# Patient Record
Sex: MALE | Race: WHITE | NOT HISPANIC OR LATINO | Employment: FULL TIME | URBAN - METROPOLITAN AREA
[De-identification: names, ages, dates, MRNs, and addresses within clinical notes are randomized per-mention and may not be internally consistent; named-entity substitution may affect disease eponyms.]

---

## 2017-05-08 ENCOUNTER — HOSPITAL ENCOUNTER (OUTPATIENT)
Dept: MRI IMAGING | Facility: CLINIC | Age: 37
Discharge: HOME | End: 2017-05-08
Attending: FAMILY MEDICINE | Admitting: FAMILY MEDICINE
Payer: COMMERCIAL

## 2017-05-08 DIAGNOSIS — M25.469 EFFUSION OF KNEE: ICD-10-CM

## 2017-05-08 PROCEDURE — 73721 MRI JNT OF LWR EXTRE W/O DYE: CPT | Mod: LT

## 2018-02-28 ENCOUNTER — TELEPHONE (OUTPATIENT)
Dept: OTOLARYNGOLOGY | Facility: CLINIC | Age: 38
End: 2018-02-28

## 2018-02-28 DIAGNOSIS — H81.10 BPV (BENIGN POSITIONAL VERTIGO): ICD-10-CM

## 2018-02-28 DIAGNOSIS — R42 VERTIGO: Primary | ICD-10-CM

## 2018-02-28 NOTE — TELEPHONE ENCOUNTER
Pt calling ENT triage to request an updated order for physical therapy.  Pt was referred to PT by Dr. Clarke in 2016 but was unable to schedule at the time due to constraints.  Pt would like to schedule at this time and needs an updated referral.  RN will assist in placing this referral.      Oliva BARTONN, RN  [phone]  [facility]   Head & Neck Surgery   2/28/2018 8:19 AM

## 2018-03-26 ENCOUNTER — HOSPITAL ENCOUNTER (OUTPATIENT)
Dept: PHYSICAL THERAPY | Facility: CLINIC | Age: 38
Setting detail: THERAPIES SERIES
End: 2018-03-26
Attending: OTOLARYNGOLOGY
Payer: COMMERCIAL

## 2018-03-26 PROCEDURE — 40000840 ZZHC STATISTIC PT VESTIBULAR VISIT: Performed by: PHYSICAL THERAPIST

## 2018-03-26 PROCEDURE — 97162 PT EVAL MOD COMPLEX 30 MIN: CPT | Mod: GP | Performed by: PHYSICAL THERAPIST

## 2018-03-28 NOTE — PROGRESS NOTES
"   18 1500   Quick Adds   Quick Adds Vestibular Eval   General Information   Start of Care Date 18   Referring Physician Dr Naina Clarke   Orders Evaluate and Treat as Indicated   Order Date 18   Medical Diagnosis Vertigo, BPV (benign positional vertigo)   Onset of illness/injury or Date of Surgery 18   Surgical/Medical history reviewed Yes   Pertinent history of current problem (include personal factors and/or comorbidities that impact the POC) Pt came prepared with a medical history including dx of BPPV about 15yrs ago by a friend who is a PT. Would get \"general vague dizziness\" - not spinning ever. Would come and go - daily for a few months, then totally gone for several months. Onset would be will URI. Had syncopal episode 4mo ago - hit head on a door when he went down. PCP thought he may have POTS - seeing cardiologist next week re: this posibility. Pt has not noticed any tachycardia with his sx. Nor has he had any hearing or vision changes other than occasionally \"needing to really concentrate to bring my world back into focus.\" Complexity: otherwise healthy.   Patient role/Employment history Employed  (RN inpatient psych)   Living environment House/[facility]   Home/Community Accessibility Comments stairs ok   Assistive Devices Comments position changes - light headed. Has 3mo old, 3yr old.   Patient/Family Goals Statement Figure out why these symptoms are happening.   General Information Comments Race bicycles - 5 days per day.   Fall Risk Screen   Fall screen completed by PT   Have you fallen 2 or more times in the past year? No   Have you fallen and had an injury in the past year? Yes   Is patient a fall risk? No   Functional Scales   Functional Scales and Outcomes DHI 36/100   Pain   Patient currently in pain No   Vital Signs   Pulse 64   BP (!) 142/96  (pt notes some whitecoat syndrome)   Other Vital Signs Standin/87, HR 67 (not reproduce sx). After balance testin/85, HR " 68. Supine: 120/77, HR 58. Sittin/71, HR 57. Standin/74, HR 66.   Cognitive Status Examination   Orientation orientation to person, place and time   Level of Consciousness alert   Follows Commands and Answers Questions 100% of the time;able to follow multistep instructions   Personal Safety and Judgment intact   Memory intact   Strength   Manual Muscle Testing Quick Adds No deficits were identified   Transfer Skills   Transfer Comments Pt able to quickly and easily stand without use of UEs.   Gait Special Tests 25 Foot Timed Walk   Seconds 5.53   Comments no AD, no gross gait deviations   Gait Special Tests Functional Gait Assessment Score out of 30   Score out of 30 29   Comments see note   Balance Special Tests Modified CTSIB Conditions   Condition 1, seconds 30 Seconds   Condition 2, seconds 30 Seconds   Condition 4, seconds 30 Seconds   Condition 5, seconds 30 Seconds   Modified CTSIB Comments no reproduction of sx, no abnormal sway.   Cervicogenic Screen   Neck ROM full ROM, no pain   Oculomotor Exam   Smooth Pursuit Normal   Saccades Normal   VOR Normal   Rapid Head Thrust Normal   Infrared Goggle Exam or Frenzel Lense Exam   Vestibular Suppressant in Last 24 Hours? No   Exam completed with Infrared Goggles   Spontaneous Nystagmus Negative   Gaze Evoked Nystagmus Negative   Head Shake Horizontal Nystagmus Negative   Liliana-Hallpike (right) Negative   Whitman-Hallpike (right) comments possible down beat nystagmus with return to midline - no sx, 3 beats. Was not repeatable.   Liliana-Hallpike (Left) Negative   [facility] Supine Roll Test (Right) Negative   [facility] Supine Roll Test (Left) Negative   Clinical Impression   Criteria for Skilled Therapeutic Interventions Met evaluation only   PT Diagnosis Unsteadiness of unclear etiology   Influenced by the following impairments Elevated subjective dizziness, reports of lightheadedness intermittently.   Functional limitations due to impairments lightheadedness with quick  positional changes.   Clinical Presentation Evolving/Changing   Clinical Presentation Rationale Increasing frequency of sx, unclear etiology   Clinical Decision Making (Complexity) Moderate complexity   Risk & Benefits of therapy have been explained Yes   Patient, Family & other staff in agreement with plan of care Yes   Clinical Impression Comments Evaluation only. No clear etiology of pt's sx based on eval today. No clear vestibular pathology, no clear tachycardia that would suggest a POTS situation. Did encourage pt to continue to see cardiology to see if they had any explanation. Message sent to cardiologist re: PT eval results.   Total Evaluation Time   Total Evaluation Time (Minutes) 45

## 2018-03-28 NOTE — PROGRESS NOTES
03/26/18 1500   Signing Clinician's Name / Credentials   Signing clinician's name / credentials Afua Hutton, DPT, NCS   Functional Gait Assessment (JADE Rousseau, MARAL Fry, et al. (2004))   1. GAIT LEVEL SURFACE 3  (4.4 secs)   2. CHANGE IN GAIT SPEED 3   3. GAIT WITH HORIZONTAL HEAD TURNS 2  (mild veer with head turn R)   4. GAIT WITH VERTICAL HEAD TURNS 3   5. GAIT AND PIVOT TURN 3   6. STEP OVER OBSTACLE 3   7. GAIT WITH NARROW BASE OF SUPPORT 3   8. GAIT WITH EYES CLOSED 3   9. AMBULATING BACKWARDS 3   10. STEPS 3   Total Functional Gait Assessment Score   TOTAL SCORE: (MAXIMUM SCORE 30) 29     Functional Gait Assessment (FGA): The FGA assesses postural stability during various walking tasks.   Gait assistive device used: none    Patient Score: 29/30  Scores of <22/30 have been correlated with predicting falls in community-dwelling older adults according to Soham & Davian 2010.   Scores of <18/30 have been correlated with increased risk for falls in patients with Parkinsons Disease according to Chase Flannery, Umana et al 2014.  Minimal Detectable Change for patients with acute/chronic stroke = 4.2 according to Henrik & Ritschel 2009  Minimal Detectable Change for patients with vestibular disorder = 8 according to Soham & Davian 2010    Assessment (rationale for performing, application to patient s function & care plan): assess falls risk, provoking activities.  Minutes billed as physical performance test: 0- day of eval

## 2018-04-05 ENCOUNTER — PRE VISIT (OUTPATIENT)
Dept: CARDIOLOGY | Facility: CLINIC | Age: 38
End: 2018-04-05

## 2018-04-05 ENCOUNTER — OFFICE VISIT (OUTPATIENT)
Dept: CARDIOLOGY | Facility: CLINIC | Age: 38
End: 2018-04-05
Attending: INTERNAL MEDICINE
Payer: COMMERCIAL

## 2018-04-05 VITALS
HEART RATE: 86 BPM | HEIGHT: 75 IN | BODY MASS INDEX: 22.38 KG/M2 | OXYGEN SATURATION: 100 % | WEIGHT: 180 LBS | SYSTOLIC BLOOD PRESSURE: 119 MMHG | DIASTOLIC BLOOD PRESSURE: 82 MMHG

## 2018-04-05 DIAGNOSIS — R42 DIZZINESS: ICD-10-CM

## 2018-04-05 DIAGNOSIS — R55 SYNCOPE, UNSPECIFIED SYNCOPE TYPE: Primary | ICD-10-CM

## 2018-04-05 PROCEDURE — 93010 ELECTROCARDIOGRAM REPORT: CPT | Mod: ZP | Performed by: INTERNAL MEDICINE

## 2018-04-05 PROCEDURE — 93005 ELECTROCARDIOGRAM TRACING: CPT | Mod: ZF

## 2018-04-05 PROCEDURE — 99203 OFFICE O/P NEW LOW 30 MIN: CPT | Mod: GC | Performed by: INTERNAL MEDICINE

## 2018-04-05 PROCEDURE — G0463 HOSPITAL OUTPT CLINIC VISIT: HCPCS | Mod: 25,ZF

## 2018-04-05 RX ORDER — SODIUM CHLORIDE 9 MG/ML
INJECTION, SOLUTION INTRAVENOUS CONTINUOUS
Status: CANCELLED | OUTPATIENT
Start: 2018-04-05

## 2018-04-05 RX ORDER — LIDOCAINE 40 MG/G
CREAM TOPICAL
Status: CANCELLED | OUTPATIENT
Start: 2018-04-05

## 2018-04-05 ASSESSMENT — PAIN SCALES - GENERAL: PAINLEVEL: NO PAIN (0)

## 2018-04-05 NOTE — NURSING NOTE
Chief Complaint   Patient presents with     Follow Up For     EKG- dizziness, ortho b/p. Ref by PCP Dr Shay Almaraz for ? POTS, saw ENT/vestibular work up 3/26/18, await care everywhere for auth to review any outisde records     Vitals were taken and medications were reconciled. EKG was performed.    Makenna Richter MA    12:48 PM

## 2018-04-05 NOTE — LETTER
4/5/2018      RE: Nathan Acharya  [address]  [place] [address]       Dear Colleague,    Thank you for the opportunity to participate in the care of your patient, Nathan Acharya, at the [facility]. Please see a copy of my visit note below.    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  His symptoms started about 15 years ago after a biking trip in [place]. He started having dizziness (he had difficulty describing what exactly he feels when gets dizzy) that he describes as difficulty focusing his vision, feeling off balance like he is going to fall and weak with turning his head. He denies having URI at the time. He saw ENT at that time and was diagnosed with BPPV. His symptoms have occurred intermittently every 3-6 months since then until 6 months ago when his symptoms became more pronounced.  In December, when he got up from his bed in morning, next thing he remembers is waking up on floor. He hit his head but no major injury. He denies having any symptoms prior to this LOC episode. He regained consciousness and his wife helped him to his feet. He denies feeling nausea or headache afterwards. No seizure like activity. Wife did not note any change in his complexion.  Over last 6 months he also has had dizziness with features included in description above as well feeling of reducing hearing and vision getting out of focus with orthostatic positional changes which is new. Prior to 6 months ago, he would get dizzy with mainly head movements. He also get dizzy with moving his eyes and while biking.  He also describes his depth perception as being off with not being able to clear a wall corner and running into wall with his side of his hip.    SH: occasional alcohol use, binge drinking while in college 10 years ago, no tobacco use or illicit drug use, he works as RN in  "psychiatry field    FH: no FH of SCD or relatives with similar symptoms    PAST MEDICAL HISTORY:  Past Medical History:   Diagnosis Date     Testicular cancer (H)        FAMILY HISTORY:  Family History   Problem Relation Age of Onset     Prostate Cancer Father        SOCIAL HISTORY:  Social History     Social History     Marital status: Single     Spouse name: N/A     Number of children: N/A     Years of education: N/A     Social History Main Topics     Smoking status: Former Smoker     Smokeless tobacco: Never Used     Alcohol use Yes     Drug use: No     Sexual activity: Yes     Partners: Female     Other Topics Concern     None     Social History Narrative       CURRENT MEDICATIONS:  Current Outpatient Prescriptions   Medication Sig Dispense Refill     loratadine (CLARITIN) 10 MG tablet Take 1 tablet (10 mg) by mouth daily (Patient not taking: Reported on 4/5/2018) 90 tablet 3     fluticasone (FLONASE) 50 MCG/ACT nasal spray Spray 1-2 sprays into both nostrils daily (Patient not taking: Reported on 4/5/2018) 16 g 11     NO ACTIVE MEDICATIONS          ROS:   Constitutional: No fever, chills, or sweats. No weight gain/loss.   ENT: No visual disturbance, ear ache, epistaxis, sore throat.   Allergies/Immunologic: Negative.   Respiratory: No cough, hemoptysis.   Cardiovascular: As per HPI.   GI: No nausea, vomiting, hematemesis, melena, or hematochezia.   : No urinary frequency, dysuria, or hematuria.   Integument: Negative.   Psychiatric: Negative.   Neuro: Negative.   Endocrinology: Negative.   Musculoskeletal: Negative.    EXAM:  /82 (BP Location: Left arm, Patient Position: Standing, Cuff Size: Adult Regular)  Pulse 86  Ht 1.905 m (6' 3\")  Wt 81.6 kg (180 lb)  SpO2 100%  BMI 22.5 kg/m2  General: appears comfortable, alert and articulate  Head: normocephalic, atraumatic  Eyes: anicteric sclera, EOMI  Neck: no adenopathy  Orophyarynx: moist mucosa, no lesions, dentition intact  Heart: regular, S1/S2, no " murmur, gallop, rub, estimated JVP 6cm  Lungs: clear, no rales or wheezing  Abdomen: soft, non-tender, bowel sounds present, no hepatosplenomegaly  Extremities: no clubbing, cyanosis or edema  Neurological: normal speech and affect, no gross motor deficits    Labs:  CBC RESULTS:  Lab Results   Component Value Date    WBC 4.8 09/11/2015    RBC 4.95 09/11/2015    HGB 15.8 09/11/2015    HCT 43.9 09/11/2015    MCV 89 09/11/2015    MCH 31.9 09/11/2015    MCHC 36.0 09/11/2015    RDW 11.9 09/11/2015     09/11/2015       CMP RESULTS:  Lab Results   Component Value Date     09/11/2015    POTASSIUM 4.4 09/11/2015    CHLORIDE 105 09/11/2015    CO2 31 09/11/2015    ANIONGAP 7.5 09/11/2015    GLC 75 09/11/2015    BUN 21 09/11/2015    CR 1.00 09/11/2015    GFRESTIMATED 84 09/11/2015    GFRESTBLACK >90 09/11/2015    VALENTE 9.4 09/11/2015    BILITOTAL 1.1 09/11/2015    ALBUMIN 4.5 09/11/2015    ALKPHOS 56 09/11/2015    ALT 30 09/11/2015    AST 22 09/11/2015        INR RESULTS:  No results found for: INR    Lab Results   Component Value Date    MAG 1.8 02/02/2014     No results found for: NTBNPI  No results found for: NTBNP    ECG today: NSR, right axis deviation, positive QRS in V1    Assessment and Plan:   37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    # Probably orthostatic hypotension  # Can't exclude vertigo or disequilibrium by history  - Tilt table study next week    # Right axis deviation, positive QRS in V1 on ECG  - Echo to exclude RVH. He had echo 10 years ago and he reports that his heart was turned more to right    -Follow up based on results of tilt table study    Staffed with Dr. Tia Davenport  General Cardiology Fellow, PGY5  Pager [phone]    I very much appreciated the opportunity to see and assess Nathan Acharya in the clinic with CV Fellow Dr Davenport I agree with the note above which summarizes my findings and current  recommendations. Please do not hesitate to contact my office if you have any questions or concerns.      Frederick Weber MD  Cardiac Arrhythmia Service  [facility]  [phone]    CC  Patient Care Team:  Frederick Case MD as PCP - General (Family Practice)  Teddy Neumann MD as Resident (Student in organized health care education/training program)  CHRISTIANE STEINBERG

## 2018-04-05 NOTE — PROGRESS NOTES
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  His symptoms started about 15 years ago after a biking trip in [place]. He started having dizziness (he had difficulty describing what exactly he feels when gets dizzy) that he describes as difficulty focusing his vision, feeling off balance like he is going to fall and weak with turning his head. He denies having URI at the time. He saw ENT at that time and was diagnosed with BPPV. His symptoms have occurred intermittently every 3-6 months since then until 6 months ago when his symptoms became more pronounced.  In December, when he got up from his bed in morning, next thing he remembers is waking up on floor. He hit his head but no major injury. He denies having any symptoms prior to this LOC episode. He regained consciousness and his wife helped him to his feet. He denies feeling nausea or headache afterwards. No seizure like activity. Wife did not note any change in his complexion.  Over last 6 months he also has had dizziness with features included in description above as well feeling of reducing hearing and vision getting out of focus with orthostatic positional changes which is new. Prior to 6 months ago, he would get dizzy with mainly head movements. He also get dizzy with moving his eyes and while biking.  He also describes his depth perception as being off with not being able to clear a wall corner and running into wall with his side of his hip.    SH: occasional alcohol use, binge drinking while in college 10 years ago, no tobacco use or illicit drug use, he works as RN in psychiatry field    FH: no FH of SCD or relatives with similar symptoms    PAST MEDICAL HISTORY:  Past Medical History:   Diagnosis Date     Testicular cancer (H)        FAMILY HISTORY:  Family History   Problem Relation Age of Onset     Prostate Cancer Father        SOCIAL HISTORY:  Social History     Social  "History     Marital status: Single     Spouse name: N/A     Number of children: N/A     Years of education: N/A     Social History Main Topics     Smoking status: Former Smoker     Smokeless tobacco: Never Used     Alcohol use Yes     Drug use: No     Sexual activity: Yes     Partners: Female     Other Topics Concern     None     Social History Narrative       CURRENT MEDICATIONS:  Current Outpatient Prescriptions   Medication Sig Dispense Refill     loratadine (CLARITIN) 10 MG tablet Take 1 tablet (10 mg) by mouth daily (Patient not taking: Reported on 4/5/2018) 90 tablet 3     fluticasone (FLONASE) 50 MCG/ACT nasal spray Spray 1-2 sprays into both nostrils daily (Patient not taking: Reported on 4/5/2018) 16 g 11     NO ACTIVE MEDICATIONS          ROS:   Constitutional: No fever, chills, or sweats. No weight gain/loss.   ENT: No visual disturbance, ear ache, epistaxis, sore throat.   Allergies/Immunologic: Negative.   Respiratory: No cough, hemoptysis.   Cardiovascular: As per HPI.   GI: No nausea, vomiting, hematemesis, melena, or hematochezia.   : No urinary frequency, dysuria, or hematuria.   Integument: Negative.   Psychiatric: Negative.   Neuro: Negative.   Endocrinology: Negative.   Musculoskeletal: Negative.    EXAM:  /82 (BP Location: Left arm, Patient Position: Standing, Cuff Size: Adult Regular)  Pulse 86  Ht 1.905 m (6' 3\")  Wt 81.6 kg (180 lb)  SpO2 100%  BMI 22.5 kg/m2  General: appears comfortable, alert and articulate  Head: normocephalic, atraumatic  Eyes: anicteric sclera, EOMI  Neck: no adenopathy  Orophyarynx: moist mucosa, no lesions, dentition intact  Heart: regular, S1/S2, no murmur, gallop, rub, estimated JVP 6cm  Lungs: clear, no rales or wheezing  Abdomen: soft, non-tender, bowel sounds present, no hepatosplenomegaly  Extremities: no clubbing, cyanosis or edema  Neurological: normal speech and affect, no gross motor deficits    Labs:  CBC RESULTS:  Lab Results   Component Value " Date    WBC 4.8 09/11/2015    RBC 4.95 09/11/2015    HGB 15.8 09/11/2015    HCT 43.9 09/11/2015    MCV 89 09/11/2015    MCH 31.9 09/11/2015    MCHC 36.0 09/11/2015    RDW 11.9 09/11/2015     09/11/2015       CMP RESULTS:  Lab Results   Component Value Date     09/11/2015    POTASSIUM 4.4 09/11/2015    CHLORIDE 105 09/11/2015    CO2 31 09/11/2015    ANIONGAP 7.5 09/11/2015    GLC 75 09/11/2015    BUN 21 09/11/2015    CR 1.00 09/11/2015    GFRESTIMATED 84 09/11/2015    GFRESTBLACK >90 09/11/2015    VALENTE 9.4 09/11/2015    BILITOTAL 1.1 09/11/2015    ALBUMIN 4.5 09/11/2015    ALKPHOS 56 09/11/2015    ALT 30 09/11/2015    AST 22 09/11/2015        INR RESULTS:  No results found for: INR    Lab Results   Component Value Date    MAG 1.8 02/02/2014     No results found for: NTBNPI  No results found for: NTBNP    ECG today: NSR, right axis deviation, positive QRS in V1    Assessment and Plan:   37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    # Probably orthostatic hypotension  # Can't exclude vertigo or disequilibrium by history  - Tilt table study next week    # Right axis deviation, positive QRS in V1 on ECG  - Echo to exclude RVH. He had echo 10 years ago and he reports that his heart was turned more to right    -Follow up based on results of tilt table study    Staffed with Dr. Tia Davenport  General Cardiology Fellow, PGY5  Pager [phone]    I very much appreciated the opportunity to see and assess Nathan Acharya in the clinic with CV Fellow Dr Davenport I agree with the note above which summarizes my findings and current recommendations. Please do not hesitate to contact my office if you have any questions or concerns.      Frederick Weber MD  Cardiac Arrhythmia Service  [facility]  [phone]    CC  Patient Care Team:  Frederick Case MD as PCP - General (Family Practice)  Teddy Neumann MD as Resident  (Student in organized health care education/training program)  CHRISTIANE STEINBERG

## 2018-04-05 NOTE — PATIENT INSTRUCTIONS
You will be scheduled for a follow up visit:       We encourage you to use My Chart as your primary form of communication if possible. If you need assistance in setting this up, please contact our office or ask at your follow up visit.     If you need a medication refill please contact your pharmacy. Please allow at least 3 business days for your refill to be completed.       Cardiology  Telephone Number    Aishwarya Funk [phone]   Or send a message to your provider via my chart.   For scheduling procedures:    [facility]    Clinic appointments       [phone]   For the Device Clinic (Pacemakers and ICD's)   RN's :   Maria Isabel Conroy  During business hours: [phone]    After business hours:   [phone]- select option 4 and ask for job code [id number].          As always, Thank you for trusting us with your health care needs!

## 2018-04-05 NOTE — TELEPHONE ENCOUNTER
HPI:     PAST MEDICAL HISTORY:  Past Medical History:   Diagnosis Date     Testicular cancer (H)        CURRENT MEDICATIONS:  Current Outpatient Prescriptions   Medication Sig Dispense Refill     loratadine (CLARITIN) 10 MG tablet Take 1 tablet (10 mg) by mouth daily 90 tablet 3     fluticasone (FLONASE) 50 MCG/ACT nasal spray Spray 1-2 sprays into both nostrils daily 16 g 11     NO ACTIVE MEDICATIONS          PAST SURGICAL HISTORY:  Past Surgical History:   Procedure Laterality Date     Testicular Cancer Surgery         ALLERGIES:   No Known Allergies    FAMILY HISTORY:  Family History   Problem Relation Age of Onset     Prostate Cancer Father        SOCIAL HISTORY:  Social History   Substance Use Topics     Smoking status: Former Smoker     Smokeless tobacco: Never Used     Alcohol use Yes       ROS:   Constitutional: No fever, chills, or sweats. Weight stable.   ENT: No visual disturbance, ear ache, epistaxis, sore throat.   Cardiovascular: As per HPI.   Respiratory: No cough, hemoptysis.    GI: No nausea, vomiting, hematemesis, melena, or hematochezia.   : No hematuria.   Integument: Negative.   Psychiatric: Negative.   Hematologic:  Easy bruising, no easy bleeding.  Neuro: Negative.   Endocrinology: No significant heat or cold intolerance   Musculoskeletal: No myalgia.    Exam:  There were no vitals taken for this visit.  GENERAL APPEARANCE: healthy, alert and no distress  HEENT: no icterus, no xanthelasmas, normal pupil size and reaction, normal palate, mucosa moist, no central cyanosis  NECK: no adenopathy, no asymmetry, masses, or scars, thyroid normal to palpation and no bruits, JVP not elevated  RESPIRATORY: lungs clear to auscultation - no rales, rhonchi or wheezes, no use of accessory muscles, no retractions, respirations are unlabored, normal respiratory rate  CARDIOVASCULAR: regular rhythm, normal S1 with physiologic split S2, no S3 or S4 and no murmur, click or rub, precordium quiet with normal  PMI.  ABDOMEN: soft, non tender, without hepatosplenomegaly, no masses palpable, bowel sounds normal, aorta not enlarged by palpation, no abdominal bruits  EXTREMITIES: peripheral pulses normal, no edema, no bruits  NEURO: alert and oriented to person/place/time, normal speech, gait and affect  VASC: Radial, femoral, dorsalis pedis and posterior tibialis pulses are normal in volumes and symmetric bilaterally. No bruits are heard.  SKIN: no ecchymoses, no rashes    Labs:  CBC RESULTS:   Lab Results   Component Value Date    WBC 4.8 09/11/2015    RBC 4.95 09/11/2015    HGB 15.8 09/11/2015    HCT 43.9 09/11/2015    MCV 89 09/11/2015    MCH 31.9 09/11/2015    MCHC 36.0 09/11/2015    RDW 11.9 09/11/2015     09/11/2015       BMP RESULTS:  Lab Results   Component Value Date     09/11/2015    POTASSIUM 4.4 09/11/2015    CHLORIDE 105 09/11/2015    CO2 31 09/11/2015    ANIONGAP 7.5 09/11/2015    GLC 75 09/11/2015    BUN 21 09/11/2015    CR 1.00 09/11/2015    GFRESTIMATED 84 09/11/2015    GFRESTBLACK >90 09/11/2015    VALENTE 9.4 09/11/2015        Procedures:              Assessment and Plan:               I very much appreciated the opportunity to see and assess this patient in the clinic with Cardiovascular Fellow        I agree with the above note which summarizes my findings and current recommendations.      Please do not hesitate to contact my office if you have any questions or concerns.       Frederick Weber MD  Cardiac Arrhythmia Service  [facility]  [phone]          CC

## 2018-04-05 NOTE — MR AVS SNAPSHOT
After Visit Summary   4/5/2018    Nathan Acharya    MRN: [id number]           Patient Information     Date Of Birth          1980        Visit Information        Provider Department      4/5/2018 12:30 PM Frederick Weber MD [facility]        Today's Diagnoses     Syncope, unspecified syncope type    -  1    Dizziness          Care Instructions    You will be scheduled for a follow up visit:       We encourage you to use My Chart as your primary form of communication if possible. If you need assistance in setting this up, please contact our office or ask at your follow up visit.     If you need a medication refill please contact your pharmacy. Please allow at least 3 business days for your refill to be completed.       Cardiology  Telephone Number    Aishwarya Funk [phone]   Or send a message to your provider via my chart.   For scheduling procedures:    [facility]    Clinic appointments       [phone]   For the Device Clinic (Pacemakers and ICD's)   RN's :   Maria Isabel Conroy  During business hours: [phone]    After business hours:   [phone]- select option 4 and ask for job code [id number].          As always, Thank you for trusting us with your health care needs!          Follow-ups after your visit        Your next 10 appointments already scheduled     Apr 09, 2018  8:30 AM CDT   Procedure 4 hour with U2A ROOM 1   Unit 2[address] [facility] ([facility])    [address] [address]               Apr 09, 2018 10:00 AM CDT   H Ep Study Tilt Table with [id number]   [facility]    [address] [address] [phone]              Future tests that were ordered for you today     Open Future Orders        Priority Expected Expires Ordered    EP study tilt table Routine  6/24/2018 4/5/2018     "Echocardiogram Routine 4/12/2018 7/11/2018 4/5/2018            Who to contact     If you have questions or need follow up information about today's clinic visit or your schedule please contact [facility] directly at [phone].  Normal or non-critical lab and imaging results will be communicated to you by Dole Tianhart, letter or phone within 4 business days after the clinic has received the results. If you do not hear from us within 7 days, please contact the clinic through Dole Tianhart or phone. If you have a critical or abnormal lab result, we will notify you by phone as soon as possible.  Submit refill requests through Jamii or call your pharmacy and they will forward the refill request to us. Please allow 3 business days for your refill to be completed.          Additional Information About Your Visit        Jamii Information     Jamii gives you secure access to your electronic health record. If you see a primary care provider, you can also send messages to your care team and make appointments. If you have questions, please call your primary care clinic.  If you do not have a primary care provider, please call [phone] and they will assist you.        Care EveryWhere ID     This is your Care EveryWhere ID. This could be used by other organizations to access your [facility] medical records  [id number]        Your Vitals Were     Pulse Height Pulse Oximetry BMI (Body Mass Index)          86 1.905 m (6' 3\") 100% 22.5 kg/m2         Blood Pressure from Last 3 Encounters:   04/05/18 119/82   06/15/16 126/78   09/20/15 100/60    Weight from Last 3 Encounters:   04/05/18 81.6 kg (180 lb)   06/15/16 85 kg (187 lb 8 oz)   09/20/15 83.9 kg (185 lb)              We Performed the Following     EKG 12-lead, tracing only (Same Day)        Primary Care Provider Office Phone # Fax #    Frederick Case [phone]       [address] [place] [address]        Equal Access to Services     " VICKI ANTON : Hadii aad reggie migue Odell, waaxda luqadaha, qaybta kaalmada anna, charles fela hayaziza mehtaramezefe ayers . So [facility] [phone].    ATENCIÓN: Si habla español, tiene a santiago disposición servicios gratuitos de asistencia lingüística. Llame al [phone].    We comply with applicable federal civil rights laws and [place] laws. We do not discriminate on the basis of race, color, national origin, age, disability, sex, sexual orientation, or gender identity.            Thank you!     Thank you for choosing [facility]  for your care. Our goal is always to provide you with excellent care. Hearing back from our patients is one way we can continue to improve our services. Please take a few minutes to complete the written survey that you may receive in the mail after your visit with us. Thank you!             Your Updated Medication List - Protect others around you: Learn how to safely use, store and throw away your medicines at www.disposemymeds.org.          This list is accurate as of 4/5/18  5:16 PM.  Always use your most recent med list.                   Brand Name Dispense Instructions for use Diagnosis    fluticasone 50 MCG/ACT spray    FLONASE    16 g    Spray 1-2 sprays into both nostrils daily    Seasonal allergic rhinitis       loratadine 10 MG tablet    CLARITIN    90 tablet    Take 1 tablet (10 mg) by mouth daily    Seasonal allergic rhinitis       NO ACTIVE MEDICATIONS

## 2018-04-06 LAB — INTERPRETATION ECG - MUSE: NORMAL

## 2018-04-09 ENCOUNTER — HOSPITAL ENCOUNTER (OUTPATIENT)
Facility: CLINIC | Age: 38
Discharge: HOME | End: 2018-04-09
Attending: INTERNAL MEDICINE | Admitting: INTERNAL MEDICINE
Payer: COMMERCIAL

## 2018-04-09 ENCOUNTER — APPOINTMENT (OUTPATIENT)
Dept: MEDSURG UNIT | Facility: CLINIC | Age: 38
End: 2018-04-09
Attending: INTERNAL MEDICINE
Payer: COMMERCIAL

## 2018-04-09 ENCOUNTER — APPOINTMENT (OUTPATIENT)
Dept: CARDIOLOGY | Facility: CLINIC | Age: 38
End: 2018-04-09
Attending: INTERNAL MEDICINE
Payer: COMMERCIAL

## 2018-04-09 VITALS
RESPIRATION RATE: 20 BRPM | TEMPERATURE: 98.1 F | HEIGHT: 75 IN | HEART RATE: 84 BPM | BODY MASS INDEX: 22.63 KG/M2 | WEIGHT: 182 LBS | DIASTOLIC BLOOD PRESSURE: 79 MMHG | OXYGEN SATURATION: 97 % | SYSTOLIC BLOOD PRESSURE: 128 MMHG

## 2018-04-09 DIAGNOSIS — R55 SYNCOPE, UNSPECIFIED SYNCOPE TYPE: ICD-10-CM

## 2018-04-09 LAB — GLUCOSE BLDC GLUCOMTR-MCNC: 83 MG/DL (ref 70–99)

## 2018-04-09 PROCEDURE — 40000166 ZZH STATISTIC PP CARE STAGE 1

## 2018-04-09 PROCEDURE — 93660 TILT TABLE EVALUATION: CPT

## 2018-04-09 PROCEDURE — 93660 TILT TABLE EVALUATION: CPT | Mod: 26 | Performed by: INTERNAL MEDICINE

## 2018-04-09 PROCEDURE — 95921 AUTONOMIC NRV PARASYM INERVJ: CPT

## 2018-04-09 PROCEDURE — 25000128 H RX IP 250 OP 636: Performed by: INTERNAL MEDICINE

## 2018-04-09 PROCEDURE — 95921 AUTONOMIC NRV PARASYM INERVJ: CPT | Mod: 26 | Performed by: INTERNAL MEDICINE

## 2018-04-09 PROCEDURE — 82962 GLUCOSE BLOOD TEST: CPT

## 2018-04-09 RX ORDER — LIDOCAINE 40 MG/G
CREAM TOPICAL
Status: DISCONTINUED | OUTPATIENT
Start: 2018-04-09 | End: 2018-04-09 | Stop reason: HOSPADM

## 2018-04-09 RX ORDER — SODIUM CHLORIDE 9 MG/ML
INJECTION, SOLUTION INTRAVENOUS CONTINUOUS
Status: DISCONTINUED | OUTPATIENT
Start: 2018-04-09 | End: 2018-04-09 | Stop reason: HOSPADM

## 2018-04-09 RX ADMIN — SODIUM CHLORIDE 1000 ML: 9 INJECTION, SOLUTION INTRAVENOUS at 09:28

## 2018-04-09 NOTE — PROGRESS NOTES
Returned from CCL s/p tilt table study.  Patient declined to have vitals taken.  Dr. Weber in room speaking with patient now.

## 2018-04-09 NOTE — PROGRESS NOTES
Pt returned post tilt table test, RN in cath lab reported pt spoke to Dr Weber and IV was discontinued in procedure, pt anxious to leave due to childcare issues; pt declined any vital signs, stated feels fine, steady walking on discharge out.  Pt dc to home per self, no sedation with procedure.

## 2018-04-09 NOTE — OP NOTE
EP PROCEDURE NOTE    *Procedures:*    1. TILT table test.  2. Autonomic function test.       *Cardiologist:*  Frederick Weber    *EP Fellow:*  N/A    *Referring MD: *  Dr Frederick Case    *Pre-operative Diagnosis:*  Syncope.    *Complications:*  None.     *Medications:*  NTG 0.4mg SL/None.     *Clinical Profile:*  Mr Acharya is a very fit 37 you male with history of presumed BPPV although the certainty of that diagnosis is uncleatr. More recently had yamilka Syncope after getting out of bed. Hit his head but no signif injury. Subsequent has dizziness assoc with reduced hearing and vision with position change. See clinic of 4/5/18 visit for details    The patient is here for autonomic testing including tilt testing.     Please see Syncope clinic visit note dated 4/5/18 for details.      PROCEDURE    The risks and benefits of the procedure were explained to the patient in   full. Written informed consent was obtained. The patient was brought to the   EP lab in a fasting and hemodynamically stable condition. Physical   examination of the patient did not reveal any carotid bruits, and review of   the chart did not reveal the presence of stroke or TIA within the past   three months.     The following maneuvers were done sequentially:    1- Sitting for 5 minutes:   End of 5 min:  HR 66 , /78    2- Standing for 10 minutes:   10 sec: HR 91 , BP 84/54   Immediate OH  30 sec HR 91  /67  1 min: HR 73 , /76  2 min: HR 75 , /78  3 min: HR 74 , /74  4 min: HR 69 , /79  5 min: HR 72 , /79  6 min: HR 72 , /77  7 min: HR 74 , /79  8 min: HR 75 , /78  9 min: HR 74 , /78  10 min: HR 78 , /84    3. Drinking  Baseline HR 84     /82    Low HR 94     /80     Latency time 36s    Vanna HR 86     /94   Latency time 44s    2- Maneuvers in seated position:    A. Carotid sinus massage:  Right:  Base HR 62 , /88.  Low  HR 40, /73     Left:  Base  HR 64 , /88  Low HR 59, /76    Symptoms: He had light headed in Right side.    B. Valsalva:   Baseline: HR 59 , /90  Phase 1: HR 70 , Max /119  Phase 2: HR 94 , Min /132  Phase 3: Present  Phase 4 (Overshoot): HR 70 , Max /90    Symptoms: None    C. Cough:   Not done    D. Deep Breathing   (1) in  53   Out  50  (2) in  54   Out  51  (3) in  54   Out  51  (4) in  54   Out  51    Delta HR = 3    3- Supine rest for 5 minutes:   HR 51 , Max /87    4- TILT at 70 degrees for 20 minutes: (1 liter fluid was given before tilt)  1 min: HR 60 , /83  2 min: HR 66 , BP 97/78  3 min: HR 64 , /76  4 min: HR 69 , /74  5 min: HR 67 , /79  6 min: HR 64 , BP 98/70  7 min: HR 70 , /73  8 min: HR 69 , /76  9 min: HR 69 , BP 94/69  10 min: HR 71 , BP 94/64  12 min: HR 66 , /73  14 min: HR 70 , BP 98/60  16 min: HR 80 , /74  18 min: HR 81 , /61  20 min: HR 71 , /79    Sinus Arrhythmia:  [id number]-67bpm :  Average delta== 14    5- NTG 0.4mg SL and monitoring for 5 minutes:   Not done      DISCUSSION:  Mr Acharya is a 36 yo male with dizziness that by history is postural in origin. He has had one LOC event getting out of bed. Findings today show some minor abnormalities. He did show Immediate OH >30 mmHg with lightheadedness consistent with his spontaneous symptoms. The Phase 4 overshoot with valsalva was missing but Phase 2 showed normal increment iof HR and good BP support.  Similarly, HRV was less than expected for age (ie 3 versus >12) but later in study he showed normal Sinus Arrhythmia. Consequently, the findings are not worrisome at this time.    On Station 2A I reviewed findings and pertinent physiology. and discussed importance of increased dietary salt and use of electectrolye drinks (approx 50 oz daily of Pedialye mixed 1:1 with water). He voiced understanding. We will follow-up in clinic in approx 3  months    Diagnosis: Immediate OH    I appreciated the opportunity to see and assess Mr Marck and direct the procedure described above. The above note summarizes my findings and current recommendations. Please do not hesitate to contact me if you have any questions or concerns.    Frederick Weber MD [facility]   Pager [phone]  Office [phone]

## 2018-04-09 NOTE — PROGRESS NOTES
Patient arrived to 2A for Tilt table procedure. IV inserted. Assessment complete. Vitals WNL. Consent needs to be signed.

## 2019-11-07 ENCOUNTER — HEALTH MAINTENANCE LETTER (OUTPATIENT)
Age: 39
End: 2019-11-07

## 2019-11-16 ENCOUNTER — IMMUNIZATION (OUTPATIENT)
Dept: NURSING | Facility: CLINIC | Age: 39
End: 2019-11-16
Payer: COMMERCIAL

## 2019-11-16 DIAGNOSIS — Z23 NEED FOR PROPHYLACTIC VACCINATION AND INOCULATION AGAINST INFLUENZA: Primary | ICD-10-CM

## 2019-11-16 PROCEDURE — 90686 IIV4 VACC NO PRSV 0.5 ML IM: CPT

## 2019-11-16 PROCEDURE — 90471 IMMUNIZATION ADMIN: CPT

## 2019-11-16 PROCEDURE — 99207 ZZC NO CHARGE NURSE ONLY: CPT

## 2020-02-14 ENCOUNTER — TRANSFERRED RECORDS (OUTPATIENT)
Dept: HEALTH INFORMATION MANAGEMENT | Facility: CLINIC | Age: 40
End: 2020-02-14

## 2020-02-14 ENCOUNTER — MEDICAL CORRESPONDENCE (OUTPATIENT)
Dept: HEALTH INFORMATION MANAGEMENT | Facility: CLINIC | Age: 40
End: 2020-02-14

## 2020-02-17 ENCOUNTER — TRANSCRIBE ORDERS (OUTPATIENT)
Dept: OTHER | Age: 40
End: 2020-02-17

## 2020-02-17 DIAGNOSIS — R68.81 EARLY SATIETY: ICD-10-CM

## 2020-02-17 DIAGNOSIS — R19.5 ABNORMAL STOOLS: ICD-10-CM

## 2020-02-17 DIAGNOSIS — R10.9 ABDOMINAL PAIN, UNSPECIFIED ABDOMINAL LOCATION: Primary | ICD-10-CM

## 2020-02-17 DIAGNOSIS — R11.0 NAUSEA: ICD-10-CM

## 2020-02-19 ENCOUNTER — DOCUMENTATION ONLY (OUTPATIENT)
Dept: GASTROENTEROLOGY | Facility: CLINIC | Age: 40
End: 2020-02-19

## 2020-02-19 NOTE — PROGRESS NOTES
Action Rosa 2/19 1:01PM    Action Taken Called and spoke with pt, recs are coming from [facility]. Per pt no outside recs or previous GI work up.   Called [facility]- Per Clinic staff she will forward a message to Medical Records dept and have OV notes faxed to [phone].      Action Rosa 2/20/20 8:27AM   Action Taken Received recs from [facility] - sent to urgent scanning      Referring Provider and Clinic:  [facility]- [facility] (Recs scanned in epic)      Diagnosis:  Abdominal pain, unspecified abdominal location, Abnormal stools, Nausea, Early satiety     GI notes or primary provider notes related to GI problem:   Y     Pathology Reports: N    Recent Lab Reports: N    Radiology Reports (CT/MRI) : N    Endoscopy:  N    Colonoscopy: N    Notes: Recs fwd to [facility]dy [facility] to review 2/20/20 8:40AM       Referral Date:     Date complete records received and sent for review:     Date records scanned into epic:     Provider Review Date:     Date review routed back to sender:     Letter sent:     Notes:

## 2020-03-03 NOTE — PROGRESS NOTES
REFERRAL REVIEW FORM    Is this a second opinion from another GI physician?  (If yes, OK to refer to for an MD only clinic visit)    Reason for referral: Abdominal pain    Date records reviewed: March 3, 2020     Previous work up:    Labs    Summary of pertinent GI work-up:  None    Recommendation:    Schedule clinic appointment with general GI provider (MD, JOSE JUAN or fellow) - Choose this if patient has not been seen by a GI provider for this problem    When to schedule:  Within 1 month    Comments:

## 2020-03-11 NOTE — PROGRESS NOTES
Pt is scheduled but not until June. CC called and lvm for pt to call direct line to get scheduled sooner if he would like.

## 2020-03-11 NOTE — TELEPHONE ENCOUNTER
RECORDS RECEIVED FROM: [facility]- Dr. Shay Almaraz   DATE RECEIVED: 6/23/2020   NOTES STATUS DETAILS   OFFICE NOTE from referring provider Received 2/14/2020 Office visit with Dr. Almaraz   OFFICE NOTE from other specialist N/A    DISCHARGE SUMMARY from hospital N/A    OPERATIVE REPORT N/A    MEDICATION LIST N/A         ENDOSCOPY  N/A    COLONOSCOPY N/A    ERCP N/A    EUS N/A    STOOL TESTING N/A    PERTINENT LABS N/A    PATHOLOGY REPORTS (RELATED) N/A    IMAGING (CT, MRI, EGD) N/A      REFERRAL INFORMATION    Date referral was placed: 6/23/2020   Date all records received: N/A   Date records were scanned into Epic: N/A   Date records were sent to Provider to review: N/A   Date and recommendation received from provider:  LETTER SENT  SCHEDULE APPOINTMENT   Date patient was contacted to schedule: 3/10/2020     Action 3/11/2020 2:49pm -Latha   Action Taken Called and LVM for Pt; called to inquire about outside med recs.      3/18/2020 9:46am Received a voice message from Pt regarding additional outside med recs. Per Pt, Pt has not been seen anywhere for those this DX. -Bhao

## 2020-06-18 ENCOUNTER — TELEPHONE (OUTPATIENT)
Dept: GASTROENTEROLOGY | Facility: CLINIC | Age: 40
End: 2020-06-18

## 2020-06-19 ENCOUNTER — RESULTS ONLY (OUTPATIENT)
Dept: LAB | Age: 40
End: 2020-06-19

## 2020-06-19 ENCOUNTER — APPOINTMENT (OUTPATIENT)
Dept: LAB | Facility: CLINIC | Age: 40
End: 2020-06-19
Attending: INTERNAL MEDICINE
Payer: COMMERCIAL

## 2020-06-19 ENCOUNTER — TELEPHONE (OUTPATIENT)
Dept: GASTROENTEROLOGY | Facility: CLINIC | Age: 40
End: 2020-06-19

## 2020-06-20 LAB
COVID-19 SPIKE RBD ABY TITER: NORMAL
COVID-19 SPIKE RBD ABY: NEGATIVE

## 2020-06-23 ENCOUNTER — PRE VISIT (OUTPATIENT)
Dept: GASTROENTEROLOGY | Facility: CLINIC | Age: 40
End: 2020-06-23

## 2020-07-31 ENCOUNTER — OFFICE VISIT - HEALTHEAST (OUTPATIENT)
Dept: OTOLARYNGOLOGY | Facility: CLINIC | Age: 40
End: 2020-07-31

## 2020-07-31 DIAGNOSIS — K21.9 LPRD (LARYNGOPHARYNGEAL REFLUX DISEASE): ICD-10-CM

## 2020-07-31 DIAGNOSIS — R13.10 DYSPHAGIA, UNSPECIFIED TYPE: ICD-10-CM

## 2020-08-04 ENCOUNTER — APPOINTMENT (OUTPATIENT)
Dept: GENERAL RADIOLOGY | Facility: CLINIC | Age: 40
End: 2020-08-04
Attending: EMERGENCY MEDICINE
Payer: OTHER MISCELLANEOUS

## 2020-08-04 ENCOUNTER — HOSPITAL ENCOUNTER (EMERGENCY)
Facility: CLINIC | Age: 40
Discharge: HOME | End: 2020-08-04
Attending: EMERGENCY MEDICINE | Admitting: EMERGENCY MEDICINE
Payer: OTHER MISCELLANEOUS

## 2020-08-04 VITALS
SYSTOLIC BLOOD PRESSURE: 136 MMHG | WEIGHT: 190 LBS | HEIGHT: 75 IN | DIASTOLIC BLOOD PRESSURE: 80 MMHG | OXYGEN SATURATION: 98 % | RESPIRATION RATE: 18 BRPM | TEMPERATURE: 97.5 F | BODY MASS INDEX: 23.62 KG/M2 | HEART RATE: 69 BPM

## 2020-08-04 DIAGNOSIS — S60.412A ABRASION OF RIGHT MIDDLE FINGER, INITIAL ENCOUNTER: ICD-10-CM

## 2020-08-04 DIAGNOSIS — S60.410A ABRASION OF RIGHT INDEX FINGER, INITIAL ENCOUNTER: ICD-10-CM

## 2020-08-04 PROCEDURE — 73140 X-RAY EXAM OF FINGER(S): CPT | Mod: RT

## 2020-08-04 PROCEDURE — 99283 EMERGENCY DEPT VISIT LOW MDM: CPT | Performed by: EMERGENCY MEDICINE

## 2020-08-04 PROCEDURE — 99283 EMERGENCY DEPT VISIT LOW MDM: CPT | Mod: Z6 | Performed by: EMERGENCY MEDICINE

## 2020-08-04 ASSESSMENT — ENCOUNTER SYMPTOMS
FEVER: 0
DIFFICULTY URINATING: 0
CONFUSION: 0
NUMBNESS: 0
WOUND: 1
SHORTNESS OF BREATH: 0
HEADACHES: 0
ABDOMINAL PAIN: 0
NECK STIFFNESS: 0
ARTHRALGIAS: 0
COLOR CHANGE: 0
EYE REDNESS: 0

## 2020-08-04 ASSESSMENT — MIFFLIN-ST. JEOR: SCORE: 1852.46

## 2020-08-04 NOTE — ED PROVIDER NOTES
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  He was on ground with hands on floor & somone opened door over the top of index and middle fingers.  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     Nathan Acharya is a right-handed 39 year old male who presents to the Emergency Department for evaluation of a right hand injury. 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 While on the ground with his hands on the floor, when someone opened the door with the top of his right second and third finger.  The patient endorses hyperextension of these fingers.  The patient states that he did clean it with soap and water.  He states this happened 3 hours ago.  The patient has noticed increased swelling since then.  Patient denies numbness.  Patient's last Tdap was 12/09/2014.    I have reviewed the Medications, Allergies, Past Medical and Surgical History, and Social History in the [employer] system.  PAST MEDICAL HISTORY:   Past Medical History:   Diagnosis Date     Testicular cancer (H)        PAST SURGICAL HISTORY:   Past Surgical History:   Procedure Laterality Date     Testicular Cancer Surgery         Past medical history, past surgical history, medications, and allergies were reviewed with the patient. Additional pertinent items: None    FAMILY HISTORY:   Family History   Problem Relation Age of Onset     Prostate Cancer Father        SOCIAL HISTORY:   Social History     Tobacco Use     Smoking status: Former Smoker     Smokeless tobacco: Never Used   Substance Use Topics     Alcohol use: Yes     Comment: occ     Social history was reviewed with the patient. Additional pertinent items: None      Patient's Medications   New Prescriptions    No medications on file   Previous  "Medications    FLUTICASONE (FLONASE) 50 MCG/ACT NASAL SPRAY    Spray 1-2 sprays into both nostrils daily    LORATADINE (CLARITIN) 10 MG TABLET    Take 1 tablet (10 mg) by mouth daily    NO ACTIVE MEDICATIONS       Modified Medications    No medications on file   Discontinued Medications    No medications on file        No Known Allergies     Review of Systems   Constitutional: Negative for fever.   HENT: Negative for congestion.    Eyes: Negative for redness.   Respiratory: Negative for shortness of breath.    Cardiovascular: Negative for chest pain.   Gastrointestinal: Negative for abdominal pain.   Genitourinary: Negative for difficulty urinating.   Musculoskeletal: Negative for arthralgias and neck stiffness.        Positive for right 2nd and 3rd finger swelling     Skin: Positive for wound (right 2nd and 3rd finger). Negative for color change.   Neurological: Negative for numbness and headaches.   Psychiatric/Behavioral: Negative for confusion.   All other systems reviewed and are negative.    Physical Exam   BP: 136/80  Pulse: 69  Temp: 97.5  F (36.4  C)  Resp: 18  Height: 190.5 cm (6' 3\")  Weight: 86.2 kg (190 lb)  SpO2: 98 %      Physical Exam  Constitutional:       General: He is not in acute distress.     Appearance: He is not diaphoretic.   HENT:      Head: Atraumatic.   Eyes:      General: No scleral icterus.     Pupils: Pupils are equal, round, and reactive to light.   Cardiovascular:      Heart sounds: Normal heart sounds.   Pulmonary:      Effort: No respiratory distress.      Breath sounds: Normal breath sounds.   Abdominal:      General: Bowel sounds are normal.      Palpations: Abdomen is soft.      Tenderness: There is no abdominal tenderness.   Musculoskeletal:         General: No tenderness.   Skin:     General: Skin is warm.      Findings: No rash.         ED Course   1:55 PM  The patient was seen and examined by Soren Mcbride MD in Room ED08.      Procedures                           Results " for orders placed or performed during the hospital encounter of 08/04/20 (from the past 24 hour(s))   XR Finger Right G/E 2 Views    Narrative    RIGHT FINGER TWO OR MORE VIEWS  8/4/2020 2:15 PM     HISTORY: Injury to index and middle finger; rule out fracture.    COMPARISON: None.      Impression    IMPRESSION: No bony abnormality. Mild soft tissue swelling about the  third finger.     Medications - No data to display          Assessments & Plan (with Medical Decision Making)   39-year-old male presents for evaluation of trauma to right index and middle fingers.  Differential included abrasion, contusion, sprain, fracture, subungual hematoma.  Exam reveals evidence of abrasions to the dorsal PIP joints of the after mentioned fingers.  X-ray was obtained which was negative.  Wounds were cleaned, antibiotic ointment applied and sterile dressing given.  Patient will be discharged with wound sheet and instructions to follow-up either here or with primary care provider for further evaluation and management.    I have reviewed the nursing notes.    I have reviewed the findings, diagnosis, plan and need for follow up with the patient.    New Prescriptions    No medications on file       Final diagnoses:   Abrasion of right index finger, initial encounter   Abrasion of right middle finger, initial encounter       8/4/2020   [facility], EMERGENCY DEPARTMENT    Jose Guadalupe MEJIA, am serving as a trained medical scribe to document services personally performed by Soren Mcbride MD, based on the provider's statements to me.     Soren MEJIA MD, was physically present and have reviewed and verified the accuracy of this note documented by Jose Guadalupe Segovia.       Soren Mcbride MD  08/04/20 [id number]

## 2020-08-04 NOTE — ED AVS SNAPSHOT
[facility], [place], Emergency Department  [address]  [place] [place] [place] [address]  Phone:  [phone]  Fax:  [phone]                                    Nathan Acharya   MRN: [id number]    Department:  [facility], Emergency Department   Date of Visit:  8/4/2020           After Visit Summary Signature Page    I have received my discharge instructions, and my questions have been answered. I have discussed any challenges I see with this plan with the nurse or doctor.    ..........................................................................................................................................  Patient/Patient Representative Signature      ..........................................................................................................................................  Patient Representative Print Name and Relationship to Patient    ..................................................               ................................................  Date                                   Time    ..........................................................................................................................................  Reviewed by Signature/Title    ...................................................              ..............................................  Date                                               Time          22EPIC Rev 08/18

## 2020-09-28 ENCOUNTER — VIRTUAL VISIT (OUTPATIENT)
Dept: FAMILY MEDICINE | Facility: CLINIC | Age: 40
End: 2020-09-28
Payer: COMMERCIAL

## 2020-09-28 VITALS — SYSTOLIC BLOOD PRESSURE: 130 MMHG | BODY MASS INDEX: 23.75 KG/M2 | DIASTOLIC BLOOD PRESSURE: 85 MMHG | WEIGHT: 190 LBS
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
## 2020-09-28 PROCEDURE — 99213 OFFICE O/P EST LOW 20 MIN: CPT | Mod: TEL | Performed by: FAMILY MEDICINE
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
## 2020-11-29 ENCOUNTER — HEALTH MAINTENANCE LETTER (OUTPATIENT)
Age: 40
End: 2020-11-29

## 2020-12-02 NOTE — TELEPHONE ENCOUNTER
MEDICAL RECORDS REQUEST   [facility]  Urology Clinic  [address] [place] [address]  PHONE: [phone]  Fax: [phone]        FUTURE VISIT INFORMATION                                                   LASHELL Ambriz: 1980 scheduled for future visit at [facility]    APPOINTMENT INFORMATION:    Date: 12/15/20 10:30AM    Provider:  Shonda Ritter PA-C    Reason for Visit/Diagnosis: Testicular pain     REFERRAL INFORMATION:    Referring provider:  Self    Specialty: N/A    Referring providers clinic:  N/A    Clinic contact number:  N/A    RECORDS REQUESTED FOR VISIT                                                     NOTES  STATUS/DETAILS   OFFICE NOTE from referring provider  no   OFFICE NOTE from other specialist  in process   DISCHARGE SUMMARY from hospital  no   DISCHARGE REPORT from the ER  no   OPERATIVE REPORT  no   MEDICATION LIST  in process     PRE-VISIT CHECKLIST      Record collection complete If no, please explain: CSS called pt to check for additional outside recs-    Appointment appropriately scheduled           (right time/right provider) Yes   MyChart activation Yes   Questionnaire complete In Process      Completed by: Rosa Flannery

## 2020-12-07 ENCOUNTER — PRE VISIT (OUTPATIENT)
Dept: UROLOGY | Facility: CLINIC | Age: 40
End: 2020-12-07

## 2020-12-07 NOTE — TELEPHONE ENCOUNTER
Visit Type : Testicular pain     Records/Orders: self referring some record in process     Pt Contacted: no    At Rooming: video

## 2020-12-15 ENCOUNTER — VIRTUAL VISIT (OUTPATIENT)
Dept: UROLOGY | Facility: CLINIC | Age: 40
End: 2020-12-15
Payer: COMMERCIAL

## 2020-12-15 ENCOUNTER — PRE VISIT (OUTPATIENT)
Dept: UROLOGY | Facility: CLINIC | Age: 40
End: 2020-12-15

## 2020-12-15 DIAGNOSIS — Z85.47 H/O TESTICULAR CANCER: ICD-10-CM

## 2020-12-15 DIAGNOSIS — N50.812 TESTICULAR PAIN, LEFT: Primary | ICD-10-CM

## 2020-12-15 DIAGNOSIS — N50.3 EPIDIDYMAL CYST: ICD-10-CM

## 2020-12-15 DIAGNOSIS — Z90.79 H/O UNILATERAL ORCHIECTOMY: ICD-10-CM

## 2020-12-15 PROCEDURE — 99201 PR OFFICE/OUTPT VISIT, NEW, LEVEL I: CPT | Mod: 95 | Performed by: PHYSICIAN ASSISTANT

## 2020-12-15 ASSESSMENT — PAIN SCALES - GENERAL: PAINLEVEL: NO PAIN (0)

## 2020-12-15 NOTE — PROGRESS NOTES
"Video Visit Technology for this patient: No video technology available to patient, please call patient over the phone  Nathan Acharya is a 40 year old male who is being evaluated via a billable telephone visit.      The patient has been notified of following:     \"This telephone visit will be conducted via a call between you and your physician/provider. We have found that certain health care needs can be provided without the need for a physical exam.  This service lets us provide the care you need with a short phone conversation.  If a prescription is necessary we can send it directly to your pharmacy.  If lab work is needed we can place an order for that and you can then stop by our lab to have the test done at a later time.    Telephone visits are billed at different rates depending on your insurance coverage. During this emergency period, for some insurers they may be billed the same as an in-person visit.  Please reach out to your insurance provider with any questions.    If during the course of the call the physician/provider feels a telephone visit is not appropriate, you will not be charged for this service.\"    Patient has given verbal consent for Telephone visit?  Yes    What phone number would you like to be contacted at? [phone]    How would you like to obtain your AVS? [facility]    Urology Telehealth Visit - New Patient    Name: Nathan Acharya    MRN: [id number]   YOB: 1980                 Chief Complaint:   Testicular pain          History of Present Illness:   Mr. Nathan Acharya is a 40 year old male with a history of testicular cancer (seminoma) s/p right orchiectomy (Dr. Mgcill) in 2008. He also has a known left epididymal cyst and previously underwent periodic ultrasound evaluation, last in 2014. His former urologist has since retired and he presents today to establish care and to request an updated ultrasound order.    He has intermittent pain in the left scrotum, which he " attributes to frequent cycling. The pain is generally mild and does not prevent him from doing his normal activities. He has had no pain over the last 2 weeks. Has not felt any new lumps in the scrotum. Thinks that he was maybe treated for epididymitis years ago for similar pain but it was not particularly helpful. He is not overly concerned or bothered by the pain but would like to update a scrotal ultrasound given his history of the above.            Past Medical History:     Past Medical History:   Diagnosis Date     Testicular cancer (H)             Past Surgical History:     Past Surgical History:   Procedure Laterality Date     Testicular Cancer Surgery              Social History:     Social History     Tobacco Use     Smoking status: Former Smoker     Smokeless tobacco: Never Used   Substance Use Topics     Alcohol use: Yes     Comment: occ            Family History:     Family History   Problem Relation Age of Onset     Prostate Cancer Father               Allergies:   No Known Allergies         Medications:     Current Outpatient Medications   Medication Sig     fluticasone (FLONASE) 50 MCG/ACT nasal spray Spray 1-2 sprays into both nostrils daily (Patient not taking: Reported on 12/15/2020)     loratadine (CLARITIN) 10 MG tablet Take 1 tablet (10 mg) by mouth daily (Patient not taking: Reported on 12/15/2020)     NO ACTIVE MEDICATIONS      No current facility-administered medications for this visit.              Review of Systems:    ROS: 14 point ROS neg other than the symptoms noted above in the HPI and PMH.        Imaging:    Testicular ultrasound 3/25/2014     Findings:   Surgical absence of the right testicle.     There is homogeneous normal echotexture of the left testicle. The left  testicle measures 5.5 x 3.4 x 2.3 cm. 1.2 x 1.0 x 0.7 mm left  epididymal tail cyst, previously measuring 1.3 x 1.0 x 0.7 mm on  7/8/2011. The left epididymis is otherwise normal. Doppler evaluation  shows normal  arterial waveforms to the left testicle. Tiny left  hydrocele, unchanged. 3.5 mm left varicocele that increases in size to  4.0 mm with Valsalva maneuver. Redemonstration of a couple echogenic  foci within the left testicle, unchanged since 7/8/2011 and most  compatible with microcalcifications. There is no mass or abnormal  calcifications.     IMPRESSION  Impression:   1. Status post right orchiectomy.  2. Left epididymal tail cyst, stable since 7/8/2011.  3. Redemonstration of a couple left testicular microcalcifications.  Findings are nonspecific.  4. Left varicocele measuring up to 4.0 mm with Valsalva.         Assessment and Plan:   40 year old male with a history of testicular cancer (seminoma) s/p right orchiectomy in 2008, left epididymal cyst, and left varicocele based on review of most recent scrotal ultrasound in 2014. Has intermittent mild pain in the left scrotum, which he attributes to frequent cycling. No pain currently and he is not overly concerned or bothered by the pain, but would like to update a scrotal ultrasound given his history of the above.     -Order for scrotal ultrasound. Schedule next available.  -Will update with results via [employer].  -Encouraged good scrotal support, NSAIDS PRN.    Follow up as needed pending ultrasound.       Phone call duration: 5 minutes    Shonda Ritter PA-C

## 2020-12-15 NOTE — PATIENT INSTRUCTIONS
UROLOGY CLINIC VISIT PATIENT INSTRUCTIONS    Schedule scrotal ultrasound next available. Will touch base with results through [employer].    If you have any issues, questions or concerns in the meantime, do not hesitate to contact us at [phone] or via [employer].     It was a pleasure meeting with you today.  Thank you for allowing me and my team the privilege of caring for you today.  YOU are the reason we are here, and I truly hope we provided you with the excellent service you deserve.  Please let us know if there is anything else we can do for you so that we can be sure you are leaving completely satisfied with your care experience.

## 2020-12-15 NOTE — LETTER
"12/15/2020       RE: Nathan Acharya  [address]  [facility] [address]     Dear Colleague,    Thank you for referring your patient, Nathan Acharya, to the [facility] [place] at [facility]. Please see a copy of my visit note below.    Video Visit Technology for this patient: No video technology available to patient, please call patient over the phone  Nathan Acharya is a 40 year old male who is being evaluated via a billable telephone visit.      The patient has been notified of following:     \"This telephone visit will be conducted via a call between you and your physician/provider. We have found that certain health care needs can be provided without the need for a physical exam.  This service lets us provide the care you need with a short phone conversation.  If a prescription is necessary we can send it directly to your pharmacy.  If lab work is needed we can place an order for that and you can then stop by our lab to have the test done at a later time.    Telephone visits are billed at different rates depending on your insurance coverage. During this emergency period, for some insurers they may be billed the same as an in-person visit.  Please reach out to your insurance provider with any questions.    If during the course of the call the physician/provider feels a telephone visit is not appropriate, you will not be charged for this service.\"    Patient has given verbal consent for Telephone visit?  Yes    What phone number would you like to be contacted at? [phone]    How would you like to obtain your AVS? [facility]    Urology Telehealth Visit - New Patient    Name: Nathan Acharya    MRN: [id number]   YOB: 1980                 Chief Complaint:   Testicular pain          History of Present Illness:   Mr. Nathan Acharya is a 40 year old male with a history of testicular cancer (seminoma) s/p right orchiectomy " (Dr. Mcgill) in 2008. He also has a known left epididymal cyst and previously underwent periodic ultrasound evaluation, last in 2014. His former urologist has since retired and he presents today to establish care and to request an updated ultrasound order.    He has intermittent pain in the left scrotum, which he attributes to frequent cycling. The pain is generally mild and does not prevent him from doing his normal activities. He has had no pain over the last 2 weeks. Has not felt any new lumps in the scrotum. Thinks that he was maybe treated for epididymitis years ago for similar pain but it was not particularly helpful. He is not overly concerned or bothered by the pain but would like to update a scrotal ultrasound given his history of the above.            Past Medical History:     Past Medical History:   Diagnosis Date     Testicular cancer (H)             Past Surgical History:     Past Surgical History:   Procedure Laterality Date     Testicular Cancer Surgery              Social History:     Social History     Tobacco Use     Smoking status: Former Smoker     Smokeless tobacco: Never Used   Substance Use Topics     Alcohol use: Yes     Comment: occ            Family History:     Family History   Problem Relation Age of Onset     Prostate Cancer Father               Allergies:   No Known Allergies         Medications:     Current Outpatient Medications   Medication Sig     fluticasone (FLONASE) 50 MCG/ACT nasal spray Spray 1-2 sprays into both nostrils daily (Patient not taking: Reported on 12/15/2020)     loratadine (CLARITIN) 10 MG tablet Take 1 tablet (10 mg) by mouth daily (Patient not taking: Reported on 12/15/2020)     NO ACTIVE MEDICATIONS      No current facility-administered medications for this visit.              Review of Systems:    ROS: 14 point ROS neg other than the symptoms noted above in the HPI and PMH.        Imaging:    Testicular ultrasound 3/25/2014     Findings:   Surgical absence of  the right testicle.     There is homogeneous normal echotexture of the left testicle. The left  testicle measures 5.5 x 3.4 x 2.3 cm. 1.2 x 1.0 x 0.7 mm left  epididymal tail cyst, previously measuring 1.3 x 1.0 x 0.7 mm on  7/8/2011. The left epididymis is otherwise normal. Doppler evaluation  shows normal arterial waveforms to the left testicle. Tiny left  hydrocele, unchanged. 3.5 mm left varicocele that increases in size to  4.0 mm with Valsalva maneuver. Redemonstration of a couple echogenic  foci within the left testicle, unchanged since 7/8/2011 and most  compatible with microcalcifications. There is no mass or abnormal  calcifications.     IMPRESSION  Impression:   1. Status post right orchiectomy.  2. Left epididymal tail cyst, stable since 7/8/2011.  3. Redemonstration of a couple left testicular microcalcifications.  Findings are nonspecific.  4. Left varicocele measuring up to 4.0 mm with Valsalva.         Assessment and Plan:   40 year old male with a history of testicular cancer (seminoma) s/p right orchiectomy in 2008, left epididymal cyst, and left varicocele based on review of most recent scrotal ultrasound in 2014. Has intermittent mild pain in the left scrotum, which he attributes to frequent cycling. No pain currently and he is not overly concerned or bothered by the pain, but would like to update a scrotal ultrasound given his history of the above.     -Order for scrotal ultrasound. Schedule next available.  -Will update with results via [employer].  -Encouraged good scrotal support, NSAIDS PRN.    Follow up as needed pending ultrasound.       Phone call duration: 5 minutes    Shonda Ritter PA-C

## 2020-12-18 ENCOUNTER — ANCILLARY PROCEDURE (OUTPATIENT)
Dept: ULTRASOUND IMAGING | Facility: CLINIC | Age: 40
End: 2020-12-18
Attending: PHYSICIAN ASSISTANT
Payer: COMMERCIAL

## 2020-12-18 DIAGNOSIS — N50.812 TESTICULAR PAIN, LEFT: ICD-10-CM

## 2020-12-18 PROCEDURE — 76870 US EXAM SCROTUM: CPT | Mod: GC | Performed by: RADIOLOGY

## 2020-12-21 DIAGNOSIS — N50.812 TESTICULAR PAIN, LEFT: Primary | ICD-10-CM

## 2021-01-11 ENCOUNTER — OFFICE VISIT (OUTPATIENT)
Dept: FAMILY MEDICINE | Facility: CLINIC | Age: 41
End: 2021-01-11
Payer: COMMERCIAL

## 2021-01-11 ENCOUNTER — MEDICAL CORRESPONDENCE (OUTPATIENT)
Dept: HEALTH INFORMATION MANAGEMENT | Facility: CLINIC | Age: 41
End: 2021-01-11

## 2021-01-11 VITALS
OXYGEN SATURATION: 98 % | SYSTOLIC BLOOD PRESSURE: 116 MMHG | DIASTOLIC BLOOD PRESSURE: 84 MMHG | TEMPERATURE: 98 F | BODY MASS INDEX: 23.25 KG/M2 | WEIGHT: 186 LBS | HEART RATE: 66 BPM

## 2021-01-11 DIAGNOSIS — K14.6 BMS (BURNING MOUTH SYNDROME): Primary | ICD-10-CM

## 2021-01-11 DIAGNOSIS — K21.9 GASTROESOPHAGEAL REFLUX DISEASE WITHOUT ESOPHAGITIS: ICD-10-CM

## 2021-01-11 DIAGNOSIS — Z76.89 ESTABLISHING CARE WITH NEW DOCTOR, ENCOUNTER FOR: ICD-10-CM

## 2021-01-11 PROCEDURE — 99203 OFFICE O/P NEW LOW 30 MIN: CPT | Performed by: NURSE PRACTITIONER

## 2021-01-11 RX ORDER — CHLORHEXIDINE GLUCONATE ORAL RINSE 1.2 MG/ML
15 SOLUTION DENTAL 2 TIMES DAILY
Qty: 118 ML | Refills: 0 | Status: SHIPPED | OUTPATIENT
Start: 2021-01-11 | End: 2024-01-10

## 2021-01-11 NOTE — PROGRESS NOTES
Assessment & Plan       ICD-10-CM    1. BMS (burning mouth syndrome)  K14.6 chlorhexidine (PERIDEX) 0.12 % solution   ear and abd exams normal, no fungus noted on tongue or lesions, will trial peridex as pt is concerned there is something wrong. Pt otherwise healthy and mild GERD and anxiety at times  Wanted to establish care today, when discussing doing lab work he said he only feels we need to proceed if I feel it's indicated today. Discussed oral hygiene and stress mgmt, can do lab work at health maintenance exam if he does continue to have symptoms                 No follow-ups on file.    ALE Larry CNP  [facility]    Luis Stevens is a 40 year old who presents to clinic today for the following health issues     HPI       Patient presents in clinic today for mouth problem. Patient says for two weeks feels like mouth was burnt by hot pizza. Patient also has other concerns today for ear pain, acid reflux, and feeling run down.     Reports symptoms as mild and no pain really just this burning sensation of the tongue that waxes and wanes, clarified didn't actually burn his tongue. Reports good habits, has history written down because he reports having anxiety so forgets details and prefers we read this instead  Also added reflux not currently and does feel tired but also stress at work, would like ear looked at, no trauma or known cause and is not sure if related to his tongue  Does not wish to have more help with anxiety today, just wanted to know, establishing care likely     New Patient/Transfer of Care  History testicular cancer and herpes noted, no current/recent issues    Review of Systems   Constitutional, HEENT, cardiovascular, pulmonary, GI, , musculoskeletal, neuro, skin, endocrine and psych systems are negative, except as otherwise noted.      Objective    /84   Pulse 66   Temp 98  F (36.7  C) (Temporal)   Wt 84.4 kg (186 lb)   SpO2 98%   BMI 23.25  kg/m    Body mass index is 23.25 kg/m .  Physical Exam   GENERAL: healthy, alert and no distress  EYES: Eyes grossly normal to inspection, PERRL and conjunctivae and sclerae normal  HENT: ear canals and TM's normal, nose and mouth without ulcers or lesions  NECK: no adenopathy, no asymmetry, masses, or scars and thyroid normal to palpation  RESP: lungs clear to auscultation - no rales, rhonchi or wheezes  CV: regular rate and rhythm, normal S1 S2, no S3 or S4, no murmur, click or rub, no peripheral edema and peripheral pulses strong  ABDOMEN: soft, nontender, no hepatosplenomegaly, no masses and bowel sounds normal, no McBurney or rebound tenderness, negative Herbert's    MS: no gross musculoskeletal defects noted, no edema  SKIN: no suspicious lesions or rashes  NEURO: Normal strength and tone, mentation intact and speech normal  PSYCH: mentation appears normal, affect normal/bright and anxious appearing at times, pleasant

## 2021-01-27 ENCOUNTER — OFFICE VISIT (OUTPATIENT)
Dept: FAMILY MEDICINE | Facility: CLINIC | Age: 41
End: 2021-01-27
Payer: COMMERCIAL

## 2021-01-27 VITALS
HEIGHT: 75 IN | DIASTOLIC BLOOD PRESSURE: 82 MMHG | TEMPERATURE: 97.8 F | BODY MASS INDEX: 23 KG/M2 | OXYGEN SATURATION: 97 % | WEIGHT: 185 LBS | HEART RATE: 65 BPM | SYSTOLIC BLOOD PRESSURE: 122 MMHG

## 2021-01-27 DIAGNOSIS — B34.9 NONSPECIFIC SYNDROME SUGGESTIVE OF VIRAL ILLNESS: Primary | ICD-10-CM

## 2021-01-27 PROCEDURE — 99213 OFFICE O/P EST LOW 20 MIN: CPT | Performed by: NURSE PRACTITIONER

## 2021-01-27 ASSESSMENT — MIFFLIN-ST. JEOR: SCORE: 1824.78

## 2021-01-27 NOTE — PROGRESS NOTES
"  Assessment & Plan       ICD-10-CM    1. Nonspecific syndrome suggestive of viral illness  B34.9     discussed differentials and could have been shingles or other viral process, discussed options and pt does agree no further work-up is indicated as his symptoms resolved. Consider CMP and CBC diff with health maintenance exam   Did go to dentist and no concerns there, cleared with peridex    Reviewed records from Care everywhere, pt does appear to have hillary to EBV-doesn't know when he had mono, discussed may have also had another mild viral illness, will not check covid hillary test after having one vaccine--he can check back to see if there will be the hillary test after getting vaccinated, all questions answered   Continue good lifestyle habits      No follow-ups on file.    ALE Larry CNP  [facility]    Luis Stevens is a 40 year old who presents to clinic today for the following health issues     HPI       Concern - Mouth Follow Up  Onset: 3 weeks  Description: mouth lesion  Intensity: moderate  Progression of Symptoms:  improving  Accompanying Signs & Symptoms: fatigue  Previous history of similar problem: no  Precipitating factors:        Worsened by: medication  Alleviating factors:        Improved by: time  Therapies tried and outcome:  none         Review of Systems         Objective    /82   Pulse 65   Temp 97.8  F (36.6  C) (Oral)   Ht 1.905 m (6' 3\")   Wt 83.9 kg (185 lb)   SpO2 97%   BMI 23.12 kg/m    Body mass index is 23.12 kg/m .  Physical Exam                   "

## 2021-01-28 NOTE — PATIENT INSTRUCTIONS
"Just more info on mild case of something you might have had. Glad everything cleared up, and see you for your health maintenance exam whenever you are due for that. Take care!    Patient Education     Viral Syndrome (Adult)  A viral illness may cause a number of symptoms such as fever. Other symptoms depend on the part of the body that the virus affects. If it settles in your nose, throat, and lungs, it may cause cough, sore throat, congestion, runny nose, headache, earache and other ear symptoms, or shortness of breath. If it settles in your stomach and intestinal tract, it may cause nausea, vomiting, cramping, and diarrhea. Sometimes it causes generalized symptoms like \"aching all over,\" feeling tired, loss of energy, or loss of appetite.  A viral illness usually lasts anywhere from several days to several weeks, but sometimes it lasts longer. In some cases, a more serious infection can look like a viral syndrome in the first few days of the illness. You may need another exam and additional tests to know the difference. Watch for the warning signs listed below for when to seek medical advice.  Home care  Follow these guidelines for taking care of yourself at home:    If symptoms are severe, rest at home for the first 2 to 3 days.    Stay away from cigarette smoke - both your smoke and the smoke from others.    You may use over-the-counter acetaminophen or ibuprofen for fever, muscle aching, and headache, unless another medicine was prescribed for this. If you have chronic liver or kidney disease or ever had a stomach ulcer or gastrointestinal bleeding, talk with your healthcare provider before using these medicines. No one who is younger than 18 and ill with a fever should take aspirin. It may cause severe disease or death.    Your appetite may be poor, so a light diet is fine. Avoid dehydration by drinking 8 to 12, 8-ounce glasses of fluids each day. This may include water; orange juice; lemonade; apple, grape, " and cranberry juice; clear fruit drinks; electrolyte replacement and sports drinks; and decaffeinated teas and coffee. If you have been diagnosed with a kidney disease, ask your healthcare provider how much and what types of fluids you should drink to prevent dehydration. If you have kidney disease, drinking too much fluid can cause it build up in the your body and be dangerous to your health.    Over-the-counter remedies won't shorten the length of the illness but may be helpful for symptoms such as cough, sore throat, nasal and sinus congestion, or diarrhea. Don't use decongestants if you have high blood pressure.  Follow-up care  Follow up with your healthcare provider if you do not improve over the next week.  Call 911  Call 911 if any of the following occur:    Convulsion    Feeling weak, dizzy, or like you are going to faint    Chest pain, or more than mild shortness of breath  When to seek medical advice  Call your healthcare provider right away if any of these occur:    Cough with lots of colored sputum (mucus) or blood in your sputum    Chest pain, shortness of breath, wheezing, or trouble breathing    Severe headache; face, neck, or ear pain    Severe, constant pain in the lower right side of your belly (abdominal)    Continued vomiting (can t keep liquids down)    Frequent diarrhea (more than 5 times a day); blood (red or black color) or mucus in diarrhea    Feeling weak, dizzy, or like you are going to faint    Extreme thirst    Fever of 100.4 F (38 C) or higher, or as directed by your healthcare provider  [employer] last reviewed this educational content on 4/1/2018 2000-2020 [employer]. [address] [place] [id number]. All rights reserved. This information is not intended as a substitute for professional medical care. Always follow your healthcare professional's instructions.           Patient Education     Shingles (Herpes Zoster)     Talk to your healthcare provider about  the shingles vaccine.   Shingles is also called herpes zoster. It's a painful skin rash caused by the herpes zoster virus. This is the same virus that causes chickenpox. After a person has chickenpox, the virus stays inactive in the nerve cells. Years later, the virus can become active again and travel along the nerve to the skin. Most people have shingles only once. But it's possible to have it more than once.  Who is at risk for shingles?  Anyone who has ever had chickenpox can get shingles. But your risk is greater if you:    Are age 50 or older    Have an illness that weakens your immune system, such as HIV/AIDS    Have cancer, especially Hodgkin disease or lymphoma    Take medicines that weaken your immune system  What are the symptoms of shingles?    The first sign of shingles is often pain, burning, tingling, or itching on one part of your face or body. You may also feel as if you have the flu, with fever and chills.    A red rash with small blisters appears in a few days. The rash may look as follows:   ? The blisters can occur anywhere, but they re most common on the back, chest, or belly (abdomen).  ? They usually appear on only one side of the body, spreading along the nerve pathway where the virus is reactivating.   ? The rash can also form around an eye, along one side of the face or neck, or in the mouth.  ? In a few people, often those with a weak immune system, shingles appear on more than one part of the body at once.    After a few days, the blisters become dry and form a crust. The crust falls off in days to weeks. The blisters generally don't leave scars. But they can in severe cases. Or if someone has a weak immune system.    How is shingles treated?  For most people, shingles heals on its own in a few days or weeks. But treatment is advised to help ease pain, speed healing, and reduce the risk of complications. Antiviral medicines are most often only prescribed if you are seen by a healthcare  provider within the first 72 hours of having the rash. But antiviral medicines may be prescribed even after 72 hours if someone's immune system is weak. Or if the infection is extensive, severe, or isn't going away. To reduce symptoms:    Apply ice packs or cool compresses, or soak in a cool bath. To make an ice pack, put ice cubes in a plastic bag that seals at the top. Wrap the bag in a clean, thin towel or cloth. Never put ice or an ice pack directly on the skin.    Use calamine lotion to calm itchy skin.    Ask your provider about over-the-counter pain relievers. If your pain is severe, your provider may prescribe stronger pain medicines.  What are possible complications of shingles?  Shingles often goes away with no lasting effects. But some people have complications during or after the infection comes out:    Postherpetic neuralgia. This is the most common complication. It's more likely as people age, especially after age 60. It' is nerve pain at the place where the rash used to be. It can range from mild to severe. It can last for only a few days, or for months or even years after you have had shingles. Antiviral medicines given during the first 72 hours of the rash can reduce the chance of postherpetic neuralgia. Other medicines can be prescribed to help ease the pain and improve quality of life.    Bacterial infection. Shingles blisters may get infected with bacteria. Depending on the severity of the infection, topical, oral or IV (intravenous) antibiotic medicine is used to treat the infection.    Eye problems. If you have shingles on the face, see your healthcare provider right away. Shingles can cause serious problems with vision, and even blindness.  In very rare cases, shingles can also lead to pneumonia, hearing problems, brain inflammation, or even death.   When to get medical care  Call your healthcare provider if you have any of these:    New symptoms or symptoms that don t go away with  treatment    A rash or blisters near your eye    More drainage, fever, or rash after treatment    Severe pain that doesn t go away  How can shingles be prevented?  You can only get shingles if you have had chickenpox in the past. Someone who never had chickenpox can get the virus from you. But instead of have shingles, the person may get chickenpox. Until your blisters form scabs, don't have any contact with others, especially the following:    Pregnant women who have never had chickenpox or the vaccine    Babies who were born early (premature) or who had low weight at birth    People with weak immune systems (such as people getting chemotherapy for cancer, people who have had organ transplants, or people with HIV infections), particularly if they have never had chicken pox.  The shingles vaccine  Two shingles vaccines are available to help prevent shingles or make it less painful:    Zoster vaccine live (ZVL)    Recombinant zoster vaccine (RZV). This is a newer vaccine that has been available since 2017.  You should get the shingles vaccine if you are healthy and age 50 or older, even if you've had shingles in the past. Two shots of the RZV vaccine are recommended. You should get the second RZV shot 2 to 6 months after the first. The vaccine makes it less likely that you will develop shingles. If you do develop shingles, your symptoms will likely be milder than if you hadn t been vaccinated. RZV is also advised even if you had the older shingles vaccine in the past. That's because the RZV vaccine works better and protects you from shingles longer.  Talk with your healthcare provider about the best time to get vaccinated. Ask which vaccine is best for you based on your age and health conditions.  [employer] last reviewed this educational content on 6/1/2019 2000-2020 [employer]. [address] [place] [address], [place] [address]. All rights reserved. This information is not intended as a substitute for  professional medical care. Always follow your healthcare professional's instructions.

## 2021-02-03 ENCOUNTER — MYC MEDICAL ADVICE (OUTPATIENT)
Dept: FAMILY MEDICINE | Facility: CLINIC | Age: 41
End: 2021-02-03

## 2021-02-03 DIAGNOSIS — R29.90 NEUROLOGICAL SYMPTOMS: Primary | ICD-10-CM

## 2021-02-03 NOTE — TELEPHONE ENCOUNTER
Fatou    See pt MyChart messages    Do you want to order some labs for him, his last labs were about a year ago with [facility] cued, are there others you wish    Nicole Mast RN   [facility]

## 2021-02-10 NOTE — TELEPHONE ENCOUNTER
Okay to do lab only, signed orders. Please have him schedule a health maintenance exam with a provider to go over results and do full exam thanks

## 2021-02-15 DIAGNOSIS — R29.90 NEUROLOGICAL SYMPTOMS: ICD-10-CM

## 2021-02-15 LAB
ALBUMIN SERPL-MCNC: 4.2 G/DL (ref 3.4–5)
ALP SERPL-CCNC: 53 U/L (ref 40–150)
ALT SERPL W P-5'-P-CCNC: 37 U/L (ref 0–70)
ANION GAP SERPL CALCULATED.3IONS-SCNC: 8 MMOL/L (ref 3–14)
AST SERPL W P-5'-P-CCNC: 38 U/L (ref 0–45)
BILIRUB SERPL-MCNC: 0.9 MG/DL (ref 0.2–1.3)
BUN SERPL-MCNC: 31 MG/DL (ref 7–30)
CALCIUM SERPL-MCNC: 8.9 MG/DL (ref 8.5–10.1)
CHLORIDE SERPL-SCNC: 102 MMOL/L (ref 94–109)
CHOLEST SERPL-MCNC: 178 MG/DL
CO2 SERPL-SCNC: 25 MMOL/L (ref 20–32)
CREAT SERPL-MCNC: 0.87 MG/DL (ref 0.66–1.25)
ERYTHROCYTE [DISTWIDTH] IN BLOOD BY AUTOMATED COUNT: 11.7 % (ref 10–15)
GFR SERPL CREATININE-BSD FRML MDRD: >90 ML/MIN/{1.73_M2}
GLUCOSE SERPL-MCNC: 76 MG/DL (ref 70–99)
HCT VFR BLD AUTO: 42.5 % (ref 40–53)
HDLC SERPL-MCNC: 74 MG/DL
HGB BLD-MCNC: 15 G/DL (ref 13.3–17.7)
LDLC SERPL CALC-MCNC: 92 MG/DL
MCH RBC QN AUTO: 31.4 PG (ref 26.5–33)
MCHC RBC AUTO-ENTMCNC: 35.3 G/DL (ref 31.5–36.5)
MCV RBC AUTO: 89 FL (ref 78–100)
NONHDLC SERPL-MCNC: 104 MG/DL
PLATELET # BLD AUTO: 255 10E9/L (ref 150–450)
POTASSIUM SERPL-SCNC: 4 MMOL/L (ref 3.4–5.3)
PROT SERPL-MCNC: 7.5 G/DL (ref 6.8–8.8)
RBC # BLD AUTO: 4.78 10E12/L (ref 4.4–5.9)
SODIUM SERPL-SCNC: 135 MMOL/L (ref 133–144)
TRIGL SERPL-MCNC: 59 MG/DL
TSH SERPL DL<=0.005 MIU/L-ACNC: 1.3 MU/L (ref 0.4–4)
WBC # BLD AUTO: 6.6 10E9/L (ref 4–11)

## 2021-02-15 PROCEDURE — 36415 COLL VENOUS BLD VENIPUNCTURE: CPT | Performed by: NURSE PRACTITIONER

## 2021-02-15 PROCEDURE — 80061 LIPID PANEL: CPT | Performed by: NURSE PRACTITIONER

## 2021-02-15 PROCEDURE — 85027 COMPLETE CBC AUTOMATED: CPT | Performed by: NURSE PRACTITIONER

## 2021-02-15 PROCEDURE — 84443 ASSAY THYROID STIM HORMONE: CPT | Performed by: NURSE PRACTITIONER

## 2021-02-15 PROCEDURE — 80053 COMPREHEN METABOLIC PANEL: CPT | Performed by: NURSE PRACTITIONER

## 2021-06-10 NOTE — PATIENT INSTRUCTIONS - HE
Start omeprazole 20mg tab, take 2 tabs 30 minutes before breakfast for 2 months.  If symptoms, then decrease to 1 tab in AM for 1 week, then 1 tablet every other day  for additional 1 week  If you wish, you can also add pepid at bedtime if you have having symptoms at night  If symptoms have not resolved, then please follow up with me    Avoid sodas.   Avoid eating 2-3 hours before bedtime.   You may find it helpful to elevate the head of your bed.   Find low acid/decaf coffee  Other possible triggers:  Mint, chocolate, tomato-based foods, carbonated beverages, garlic, onion, etc    Contact me via [employer] if symptoms to not resolve

## 2021-06-10 NOTE — PROGRESS NOTES
"CHIEF COMPLAINT:   Chief Complaint   Patient presents with     Sore Throat     past  2 months            HISTORY OF PRESENT ILLNESS    Ntahan\"was seen at the behest of No ref. provider found for evaluation of There are no active non-hospital problems to display for this patient.    \"Swallowing issues has been gradually worse, but really became noticeable about 2 months ago.   Since then, it almost feels like my throat has gotten smaller or something. I've struggled to get food down at times and sometimes have to swallow 2-3x to get food down, or even had corey it down with water.  I have not had any instances of getting food/water in my lungs, but likely would have if I was older and didn't have a strong cough. Of note, about two weeks ago I went off alcohol and caffiene and I fee there has been at least a 30% improvement in my ability to swallow.\"     REVIEW OF SYSTEMS    Review of Systems: a 10-system review is reviewed at this encounter.  See scanned document.     Patient has no known allergies.     PHYSICAL EXAM:     CONSTITUTIONAL:   General Appearance:  Normal, well developed, well nourished, no obvious distress     HEAD: Normal appearance and Symmetry:  No cutaneous lesions.      EARS:    Clinical speech reception threshold:  Normal, able to hear normal speech.  Auricles:  Normal    Normal TM's bilaterally. Normal auditory canals and external ears. Non-tender.       NOSE:    Architecture:  Normal, Grossly normal external nasal architecture with no masses or lesions.  Mucosa:  Normal mucosa, No polyps or masses.  Septum:  Normal   Turbinates:  Normal, No turbinate abnormalities     ORAL CAVITY/OROPHARYNX    Lips:  Normal.  Mucosa:  Normal, Moist mucous membranes.  Tongue:  Normal, midline  Palate: Normal  Oral pharynx:  Normal,   Tonsil Hypertrophy:2+     NECK    Lymphadenopathy:  None  Trachea:  midline.  Thyroid:  Normal         NEUROLOGIC:   Normal gait and station     RESPIRATORY:   Symmetry and " Respiratory effort     LARYNGOSCOPY:   Done with flexible scope.   Right mid septal spur is noted.  Otherwise negative exam.    IMPRESSION    Encounter Diagnoses   Name Primary?     LPRD (laryngopharyngeal reflux disease) Yes     Dysphagia, unspecified type           RECOMMENDATIONS:    Start omeprazole 20mg tab, take 2 tabs 30 minutes before breakfast for 2 months.  If symptoms, then decrease to 1 tab in AM for 1 week, then 1/2 tablet for additional 1 [place]  If you wish, you can also add pepid at bedtime if you have having symptoms at night  If symptoms have not resolved, then please follow up with me    Avoid sodas.   Avoid eating 2-3 hours before bedtime.   You may find it helpful to elevate the head of your bed.   Find low acid/decaf coffee  Other possible triggers:  Mint, chocolate, tomato-based foods, carbonated beverages, etc

## 2021-08-20 ENCOUNTER — TRANSCRIBE ORDERS (OUTPATIENT)
Dept: OTHER | Age: 41
End: 2021-08-20

## 2021-08-20 DIAGNOSIS — R53.83 FATIGUE: Primary | ICD-10-CM

## 2021-08-20 DIAGNOSIS — R19.7 DIARRHEA: ICD-10-CM

## 2021-09-25 ENCOUNTER — HEALTH MAINTENANCE LETTER (OUTPATIENT)
Age: 41
End: 2021-09-25

## 2021-10-01 ENCOUNTER — TRANSCRIBE ORDERS (OUTPATIENT)
Dept: PHARMACY | Facility: PHYSICIAN GROUP | Age: 41
End: 2021-10-01

## 2021-10-01 ENCOUNTER — TELEPHONE (OUTPATIENT)
Dept: GASTROENTEROLOGY | Facility: CLINIC | Age: 41
End: 2021-10-01

## 2021-10-01 ENCOUNTER — TRANSCRIBE ORDERS (OUTPATIENT)
Dept: OTHER | Age: 41
End: 2021-10-01

## 2021-10-01 DIAGNOSIS — R13.10 DYSPHAGIA, UNSPECIFIED TYPE: Primary | ICD-10-CM

## 2021-10-01 DIAGNOSIS — Z11.59 ENCOUNTER FOR SCREENING FOR OTHER VIRAL DISEASES: ICD-10-CM

## 2021-10-01 DIAGNOSIS — R76.8 ELEVATED ANTINUCLEAR ANTIBODY (ANA) LEVEL: ICD-10-CM

## 2021-10-01 DIAGNOSIS — R19.5 ELEVATED FECAL CALPROTECTIN: ICD-10-CM

## 2021-10-01 DIAGNOSIS — R10.12 LUQ PAIN: ICD-10-CM

## 2021-10-01 DIAGNOSIS — R53.83 FATIGUE: ICD-10-CM

## 2021-10-01 DIAGNOSIS — R53.83 FATIGUE, UNSPECIFIED TYPE: ICD-10-CM

## 2021-10-01 DIAGNOSIS — R19.5 ELEVATED FECAL CALPROTECTIN: Primary | ICD-10-CM

## 2021-10-01 DIAGNOSIS — R13.10 DYSPHAGIA, UNSPECIFIED TYPE: ICD-10-CM

## 2021-10-01 DIAGNOSIS — R10.12 LUQ ABDOMINAL PAIN: ICD-10-CM

## 2021-10-01 NOTE — TELEPHONE ENCOUNTER
Screening Questions  1. Are you active on [employer]?    2. What insurance is in the chart? PREFERREDONE    2.  Ordering/Referring Provider: Anita Jeronimo MD @ [facility]    3. BMI 23.1    4. Are you on daily home oxygen? N    5. Have you had a heart, lung, or liver transplant? N    6. Are you currently on dialysis or have chronic kidney disease? N    7. Have you had a stroke or Transient ischemic atttack (TIA) within 6 months? N    8. In the past 6 months, have you had any heart related issues including cardiomyopathy or heart attack? N      If yes, did it require cardiac stenting or other implantable device?N      9. Do you have any implantable devices in your body (pacemaker, defib, LVAD)? N    10. Do you take nitroglycerin? If yes, how often? N    11. Are you currently taking any blood thinners?N    12. Are you a diabetic? N    13. (Females) Are you currently pregnant? n  If yes, how many weeks?      15. Are you taking any prescription pain medications on a routine schedule? n If yes, MAC sedation.    16. Do you have any chemical dependencies such as alcohol, street drugs, or methadone? n If yes, MAC sedation.    17. Do you have any history of post-traumatic stress syndrome, severe anxiety or history of psychosis? n    18. Do you transfer independently? y    19.  Do you have any issues with constipation? n    20. Preferred Pharmacy for Pre Prescription [facility]    Scheduling Details    Which Colonoscopy Prep was Sent?: MPREP  Procedure Scheduled: EGD/COLON  Provider/Surgeon: KB  Date of Procedure: 10/14 635 ARRIVAL  [place] [facility] [place]er (Please ask for phone number if not scheduled by patient): SHANA      Sedation Type: MAC  Conscious Sedation- Needs  for 6 hours after the procedure  MAC/General-Needs  for 24 hours after procedure    Pre-op Required at [facility] [place] [place] and OR for MAC sedation:   (if yes advise patient they will need a pre-op prior to procedure)      Is patient  on blood thinners? 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

## 2021-10-08 ENCOUNTER — TELEPHONE (OUTPATIENT)
Dept: GASTROENTEROLOGY | Facility: CLINIC | Age: 41
End: 2021-10-08

## 2021-10-08 NOTE — TELEPHONE ENCOUNTER
Attempted to contact patient regarding upcoming colonoscopy/EGD procedure on 10/14/21 for pre assessment questions. No answer.     Left message to return call to [phone] #2    Covid test scheduled? Left Covid test scheduling number [phone] on voicemail.     Arrival time: 0635    Facility location: [facility]    Sedation type: MAC    Bowel prep recommendation: Miralax/Magnesium citrate/Dulcolax     Will send mychart message to return call.    Vika Silva RN

## 2021-10-11 ENCOUNTER — TELEPHONE (OUTPATIENT)
Dept: GASTROENTEROLOGY | Facility: CLINIC | Age: 41
End: 2021-10-11

## 2021-10-11 ENCOUNTER — LAB (OUTPATIENT)
Dept: LAB | Facility: CLINIC | Age: 41
End: 2021-10-11
Payer: COMMERCIAL

## 2021-10-11 DIAGNOSIS — Z11.59 ENCOUNTER FOR SCREENING FOR OTHER VIRAL DISEASES: ICD-10-CM

## 2021-10-11 PROCEDURE — U0003 INFECTIOUS AGENT DETECTION BY NUCLEIC ACID (DNA OR RNA); SEVERE ACUTE RESPIRATORY SYNDROME CORONAVIRUS 2 (SARS-COV-2) (CORONAVIRUS DISEASE [COVID-19]), AMPLIFIED PROBE TECHNIQUE, MAKING USE OF HIGH THROUGHPUT TECHNOLOGIES AS DESCRIBED BY CMS-2020-01-R: HCPCS

## 2021-10-11 PROCEDURE — U0005 INFEC AGEN DETEC AMPLI PROBE: HCPCS

## 2021-10-11 NOTE — TELEPHONE ENCOUNTER
Covid test scheduled: to be scheduled    Arrival time: 0635    Facility location: [facility]    Sedation type: MAC    Bowel prep recommendation: Merced Escalera RN    Instructions,  policy, MAC sedation plan reviewed. Instructed patient to have someone stay with them for 24 hours post exam

## 2021-10-12 LAB — SARS-COV-2 RNA RESP QL NAA+PROBE: NEGATIVE

## 2021-10-13 ENCOUNTER — ANESTHESIA EVENT (OUTPATIENT)
Dept: SURGERY | Facility: AMBULATORY SURGERY CENTER | Age: 41
End: 2021-10-13
Payer: COMMERCIAL

## 2021-10-13 NOTE — ANESTHESIA PREPROCEDURE EVALUATION
Anesthesia Pre-Procedure Evaluation    Patient: Nathan Acharya   MRN: [id number] : 1980        Preoperative Diagnosis: Elevated fecal calprotectin [R19.5]  LUQ pain [R10.12]  Dysphagia, unspecified type [R13.10]  Fatigue, unspecified type [R53.83]  Elevated antinuclear antibody (VIKRAM) level [R76.8]    Procedure : Procedure(s):  ESOPHAGOGASTRODUODENOSCOPY (EGD)  COLONOSCOPY          Past Medical History:   Diagnosis Date     Testicular cancer (H)       Past Surgical History:   Procedure Laterality Date     Testicular Cancer Surgery        No Known Allergies   Social History     Tobacco Use     Smoking status: Former Smoker     Smokeless tobacco: Never Used   Substance Use Topics     Alcohol use: Yes     Comment: occ      Wt Readings from Last 1 Encounters:   21 83.9 kg (185 lb)        Anesthesia Evaluation   Pt has had prior anesthetic.         ROS/MED HX  ENT/Pulmonary:  - neg pulmonary ROS     Neurologic:  - neg neurologic ROS     Cardiovascular:  - neg cardiovascular ROS     METS/Exercise Tolerance:     Hematologic:  - neg hematologic  ROS     Musculoskeletal:  - neg musculoskeletal ROS     GI/Hepatic: Comment: LUQ pain      Renal/Genitourinary:  - neg Renal ROS     Endo:  - neg endo ROS     Psychiatric/Substance Use:  - neg psychiatric ROS     Infectious Disease:  - neg infectious disease ROS     Malignancy:   (+) Malignancy, History of Other.Other CA history of testicular cancer status post.    Other:  - neg other ROS          Physical Exam    Airway        Mallampati: I   TM distance: > 3 FB   Neck ROM: full   Mouth opening: > 3 cm    Respiratory Devices and Support         Dental           Cardiovascular             Pulmonary                   OUTSIDE LABS:  CBC:   Lab Results   Component Value Date    WBC 6.6 02/15/2021    WBC 4.8 2015    HGB 15.0 02/15/2021    HGB 15.8 2015    HCT 42.5 02/15/2021    HCT 43.9 2015     02/15/2021     2015     BMP:   Lab  Results   Component Value Date     02/15/2021     09/11/2015    POTASSIUM 4.0 02/15/2021    POTASSIUM 4.4 09/11/2015    CHLORIDE 102 02/15/2021    CHLORIDE 105 09/11/2015    CO2 25 02/15/2021    CO2 31 09/11/2015    BUN 31 (H) 02/15/2021    BUN 21 09/11/2015    CR 0.87 02/15/2021    CR 1.00 09/11/2015    GLC 76 02/15/2021    GLC 75 09/11/2015     COAGS: No results found for: PTT, INR, FIBR  POC:   Lab Results   Component Value Date    BGM 83 04/09/2018     HEPATIC:   Lab Results   Component Value Date    ALBUMIN 4.2 02/15/2021    PROTTOTAL 7.5 02/15/2021    ALT 37 02/15/2021    AST 38 02/15/2021    ALKPHOS 53 02/15/2021    BILITOTAL 0.9 02/15/2021     OTHER:   Lab Results   Component Value Date    VALENTE 8.9 02/15/2021    MAG 1.8 02/02/2014    TSH 1.30 02/15/2021    SED 4 09/11/2015       Anesthesia Plan    ASA Status:  2   NPO Status:  NPO Appropriate    Anesthesia Type: MAC.     - Reason for MAC: straight local not clinically adequate   Induction: Intravenous.   Maintenance: TIVA.        Consents            Postoperative Care    Pain management: IV analgesics.   PONV prophylaxis: Background Propofol Infusion     Comments:         H&P reviewed: Unable to attach H&P to encounter due to EHR limitations. H&P Update: appropriate H&P reviewed, patient examined. No interval changes since H&P (within 30 days).         Vanessa Fernando MD

## 2021-10-14 ENCOUNTER — ANESTHESIA (OUTPATIENT)
Dept: SURGERY | Facility: AMBULATORY SURGERY CENTER | Age: 41
End: 2021-10-14
Payer: COMMERCIAL

## 2021-10-14 ENCOUNTER — HOSPITAL ENCOUNTER (OUTPATIENT)
Facility: AMBULATORY SURGERY CENTER | Age: 41
End: 2021-10-14
Attending: INTERNAL MEDICINE
Payer: COMMERCIAL

## 2021-10-14 VITALS
RESPIRATION RATE: 14 BRPM | OXYGEN SATURATION: 97 % | DIASTOLIC BLOOD PRESSURE: 78 MMHG | HEIGHT: 75 IN | BODY MASS INDEX: 23.38 KG/M2 | TEMPERATURE: 97.1 F | WEIGHT: 188 LBS | HEART RATE: 66 BPM | SYSTOLIC BLOOD PRESSURE: 125 MMHG

## 2021-10-14 VITALS — HEART RATE: 84 BPM

## 2021-10-14 LAB
COLONOSCOPY: NORMAL
UPPER GI ENDOSCOPY: NORMAL

## 2021-10-14 PROCEDURE — 88305 TISSUE EXAM BY PATHOLOGIST: CPT | Mod: TC | Performed by: INTERNAL MEDICINE

## 2021-10-14 PROCEDURE — 45380 COLONOSCOPY AND BIOPSY: CPT

## 2021-10-14 PROCEDURE — 43239 EGD BIOPSY SINGLE/MULTIPLE: CPT

## 2021-10-14 PROCEDURE — 88305 TISSUE EXAM BY PATHOLOGIST: CPT | Mod: 26 | Performed by: PATHOLOGY

## 2021-10-14 RX ORDER — NALOXONE HYDROCHLORIDE 0.4 MG/ML
0.2 INJECTION, SOLUTION INTRAMUSCULAR; INTRAVENOUS; SUBCUTANEOUS
Status: DISCONTINUED | OUTPATIENT
Start: 2021-10-14 | End: 2021-10-15 | Stop reason: HOSPADM

## 2021-10-14 RX ORDER — ONDANSETRON 4 MG/1
4 TABLET, ORALLY DISINTEGRATING ORAL EVERY 6 HOURS PRN
Status: DISCONTINUED | OUTPATIENT
Start: 2021-10-14 | End: 2021-10-15 | Stop reason: HOSPADM

## 2021-10-14 RX ORDER — FLUMAZENIL 0.1 MG/ML
0.2 INJECTION, SOLUTION INTRAVENOUS
Status: ACTIVE | OUTPATIENT
Start: 2021-10-14 | End: 2021-10-14

## 2021-10-14 RX ORDER — HYDROMORPHONE HYDROCHLORIDE 1 MG/ML
0.2 INJECTION, SOLUTION INTRAMUSCULAR; INTRAVENOUS; SUBCUTANEOUS EVERY 5 MIN PRN
Status: DISCONTINUED | OUTPATIENT
Start: 2021-10-14 | End: 2021-10-14 | Stop reason: HOSPADM

## 2021-10-14 RX ORDER — NALOXONE HYDROCHLORIDE 0.4 MG/ML
0.4 INJECTION, SOLUTION INTRAMUSCULAR; INTRAVENOUS; SUBCUTANEOUS
Status: DISCONTINUED | OUTPATIENT
Start: 2021-10-14 | End: 2021-10-15 | Stop reason: HOSPADM

## 2021-10-14 RX ORDER — LABETALOL HYDROCHLORIDE 5 MG/ML
10 INJECTION, SOLUTION INTRAVENOUS
Status: DISCONTINUED | OUTPATIENT
Start: 2021-10-14 | End: 2021-10-14 | Stop reason: HOSPADM

## 2021-10-14 RX ORDER — ONDANSETRON 2 MG/ML
4 INJECTION INTRAMUSCULAR; INTRAVENOUS
Status: DISCONTINUED | OUTPATIENT
Start: 2021-10-14 | End: 2021-10-14 | Stop reason: HOSPADM

## 2021-10-14 RX ORDER — LIDOCAINE 40 MG/G
CREAM TOPICAL
Status: DISCONTINUED | OUTPATIENT
Start: 2021-10-14 | End: 2021-10-14 | Stop reason: HOSPADM

## 2021-10-14 RX ORDER — ONDANSETRON 2 MG/ML
4 INJECTION INTRAMUSCULAR; INTRAVENOUS EVERY 30 MIN PRN
Status: DISCONTINUED | OUTPATIENT
Start: 2021-10-14 | End: 2021-10-14 | Stop reason: HOSPADM

## 2021-10-14 RX ORDER — PROPOFOL 10 MG/ML
INJECTION, EMULSION INTRAVENOUS PRN
Status: DISCONTINUED | OUTPATIENT
Start: 2021-10-14 | End: 2021-10-14

## 2021-10-14 RX ORDER — LIDOCAINE HYDROCHLORIDE 20 MG/ML
INJECTION, SOLUTION INFILTRATION; PERINEURAL PRN
Status: DISCONTINUED | OUTPATIENT
Start: 2021-10-14 | End: 2021-10-14

## 2021-10-14 RX ORDER — PROPOFOL 10 MG/ML
INJECTION, EMULSION INTRAVENOUS CONTINUOUS PRN
Status: DISCONTINUED | OUTPATIENT
Start: 2021-10-14 | End: 2021-10-14

## 2021-10-14 RX ORDER — SODIUM CHLORIDE, SODIUM LACTATE, POTASSIUM CHLORIDE, CALCIUM CHLORIDE 600; 310; 30; 20 MG/100ML; MG/100ML; MG/100ML; MG/100ML
INJECTION, SOLUTION INTRAVENOUS CONTINUOUS
Status: DISCONTINUED | OUTPATIENT
Start: 2021-10-14 | End: 2021-10-14 | Stop reason: HOSPADM

## 2021-10-14 RX ORDER — PROCHLORPERAZINE MALEATE 10 MG
10 TABLET ORAL EVERY 6 HOURS PRN
Status: DISCONTINUED | OUTPATIENT
Start: 2021-10-14 | End: 2021-10-15 | Stop reason: HOSPADM

## 2021-10-14 RX ORDER — ONDANSETRON 4 MG/1
4 TABLET, ORALLY DISINTEGRATING ORAL EVERY 30 MIN PRN
Status: DISCONTINUED | OUTPATIENT
Start: 2021-10-14 | End: 2021-10-14 | Stop reason: HOSPADM

## 2021-10-14 RX ORDER — ONDANSETRON 2 MG/ML
4 INJECTION INTRAMUSCULAR; INTRAVENOUS EVERY 6 HOURS PRN
Status: DISCONTINUED | OUTPATIENT
Start: 2021-10-14 | End: 2021-10-15 | Stop reason: HOSPADM

## 2021-10-14 RX ORDER — SIMETHICONE
LIQUID (ML) MISCELLANEOUS PRN
Status: DISCONTINUED | OUTPATIENT
Start: 2021-10-14 | End: 2021-10-14 | Stop reason: HOSPADM

## 2021-10-14 RX ORDER — FENTANYL CITRATE 50 UG/ML
25 INJECTION, SOLUTION INTRAMUSCULAR; INTRAVENOUS EVERY 5 MIN PRN
Status: DISCONTINUED | OUTPATIENT
Start: 2021-10-14 | End: 2021-10-14 | Stop reason: HOSPADM

## 2021-10-14 RX ADMIN — PROPOFOL 100 MG: 10 INJECTION, EMULSION INTRAVENOUS at 08:14

## 2021-10-14 RX ADMIN — LIDOCAINE HYDROCHLORIDE 100 MG: 20 INJECTION, SOLUTION INFILTRATION; PERINEURAL at 08:09

## 2021-10-14 RX ADMIN — PROPOFOL 40 MG: 10 INJECTION, EMULSION INTRAVENOUS at 08:15

## 2021-10-14 RX ADMIN — SODIUM CHLORIDE, SODIUM LACTATE, POTASSIUM CHLORIDE, CALCIUM CHLORIDE: 600; 310; 30; 20 INJECTION, SOLUTION INTRAVENOUS at 08:08

## 2021-10-14 RX ADMIN — PROPOFOL 20 MG: 10 INJECTION, EMULSION INTRAVENOUS at 08:42

## 2021-10-14 RX ADMIN — PROPOFOL 10 MG: 10 INJECTION, EMULSION INTRAVENOUS at 08:25

## 2021-10-14 RX ADMIN — PROPOFOL 200 MCG/KG/MIN: 10 INJECTION, EMULSION INTRAVENOUS at 08:09

## 2021-10-14 RX ADMIN — PROPOFOL 20 MG: 10 INJECTION, EMULSION INTRAVENOUS at 08:19

## 2021-10-14 ASSESSMENT — MIFFLIN-ST. JEOR: SCORE: 1833.39

## 2021-10-14 NOTE — H&P
"Nathan Acharya  [id number]  male  41 year old      Reason for procedure/surgery: egd/colon dysphagia, abdominal pain    Patient Active Problem List   Diagnosis     CARDIOVASCULAR SCREENING; LDL GOAL LESS THAN 130     Herpes simplex     Impetigo       Past Surgical History:    Past Surgical History:   Procedure Laterality Date     Testicular Cancer Surgery         Past Medical History:   Past Medical History:   Diagnosis Date     Testicular cancer (H)        Social History:   Social History     Tobacco Use     Smoking status: Former Smoker     Smokeless tobacco: Never Used   Substance Use Topics     Alcohol use: Yes     Comment: occ       Family History:   Family History   Problem Relation Age of Onset     Prostate Cancer Father        Allergies: No Known Allergies    Active Medications:   Current Outpatient Medications   Medication Sig Dispense Refill     chlorhexidine (PERIDEX) 0.12 % solution Swish and spit 15 mLs in mouth 2 times daily (Patient not taking: Reported on 1/27/2021) 118 mL 0     fluticasone (FLONASE) 50 MCG/ACT nasal spray Spray 1-2 sprays into both nostrils daily (Patient not taking: Reported on 1/27/2021) 16 g 11     loratadine (CLARITIN) 10 MG tablet Take 1 tablet (10 mg) by mouth daily (Patient not taking: Reported on 1/27/2021) 90 tablet 3     NO ACTIVE MEDICATIONS          Systemic Review:   CONSTITUTIONAL: NEGATIVE for fever, chills, change in weight  ENT/MOUTH: NEGATIVE for ear, mouth and throat problems  RESP: NEGATIVE for significant cough or SOB  CV: NEGATIVE for chest pain, palpitations or peripheral edema    Physical Examination:   Vital Signs: /83   Pulse 66   Temp 97.8  F (36.6  C) (Temporal)   Resp 16   Ht 1.905 m (6' 3\")   Wt 85.3 kg (188 lb)   SpO2 98%   BMI 23.50 kg/m    GENERAL: healthy, alert and no distress  NECK: no adenopathy, no asymmetry, masses, or scars  RESP: lungs clear to auscultation - no rales, rhonchi or wheezes  CV: regular rate and rhythm, normal S1 " S2, no S3 or S4, no murmur, click or rub, no peripheral edema and peripheral pulses strong  ABDOMEN: soft, nontender, no hepatosplenomegaly, no masses and bowel sounds normal  MS: no gross musculoskeletal defects noted, no edema    Plan: Appropriate to proceed as scheduled.      Sidney Sorto DO  10/14/2021    PCP:  Fatou Purdy

## 2021-10-14 NOTE — ANESTHESIA CARE TRANSFER NOTE
Patient: Nathan LOERA Loosebibiana    Procedure: Procedure(s):  ESOPHAGOGASTRODUODENOSCOPY, WITH BIOPSY  COLONOSCOPY, WITH  BIOPSY       Diagnosis: Elevated fecal calprotectin [R19.5]  LUQ pain [R10.12]  Dysphagia, unspecified type [R13.10]  Fatigue, unspecified type [R53.83]  Elevated antinuclear antibody (VIKRAM) level [R76.8]  Diagnosis Additional Information: No value filed.    Anesthesia Type:   MAC     Note:    Oropharynx: oropharynx clear of all foreign objects and spontaneously breathing  Level of Consciousness: awake  Oxygen Supplementation: room air    Independent Airway: airway patency satisfactory and stable  Dentition: dentition unchanged  Vital Signs Stable: post-procedure vital signs reviewed and stable  Report to RN Given: handoff report given  Patient transferred to: Phase II    Handoff Report: Identifed the Patient, Identified the Reponsible Provider, Reviewed the pertinent medical history, Discussed the surgical course, Reviewed Intra-OP anesthesia mangement and issues during anesthesia, Set expectations for post-procedure period and Allowed opportunity for questions and acknowledgement of understanding      Vitals:  Vitals Value Taken Time   /83 10/14/21 0904   Temp 36.2  C (97.1  F) 10/14/21 0904   Pulse     Resp 12 10/14/21 0904   SpO2 99 % 10/14/21 0904       Electronically Signed By: ALE Fu CRNA  October 14, 2021  9:09 AM

## 2021-10-14 NOTE — ANESTHESIA POSTPROCEDURE EVALUATION
Patient: Nathan Acharya    Procedure: Procedure(s):  ESOPHAGOGASTRODUODENOSCOPY, WITH BIOPSY  COLONOSCOPY, WITH  BIOPSY       Diagnosis:Elevated fecal calprotectin [R19.5]  LUQ pain [R10.12]  Dysphagia, unspecified type [R13.10]  Fatigue, unspecified type [R53.83]  Elevated antinuclear antibody (VIKRAM) level [R76.8]  Diagnosis Additional Information: No value filed.    Anesthesia Type:  MAC    Note:  Disposition: Outpatient   Postop Pain Control: Uneventful            Sign Out: Well controlled pain   PONV: No   Neuro/Psych: Uneventful            Sign Out: Acceptable/Baseline neuro status   Airway/Respiratory: Uneventful            Sign Out: Acceptable/Baseline resp. status   CV/Hemodynamics: Uneventful            Sign Out: Acceptable CV status; No obvious hypovolemia; No obvious fluid overload   Other NRE: NONE   DID A NON-ROUTINE EVENT OCCUR? No           Last vitals:  Vitals Value Taken Time   /78 10/14/21 0924   Temp 36.2  C (97.1  F) 10/14/21 0924   Pulse     Resp 14 10/14/21 0924   SpO2 97 % 10/14/21 0924       Electronically Signed By: Vanessa Fernando MD  October 14, 2021  10:27 AM

## 2021-10-18 LAB
PATH REPORT.COMMENTS IMP SPEC: NORMAL
PATH REPORT.COMMENTS IMP SPEC: NORMAL
PATH REPORT.FINAL DX SPEC: NORMAL
PATH REPORT.GROSS SPEC: NORMAL
PATH REPORT.MICROSCOPIC SPEC OTHER STN: NORMAL
PATH REPORT.RELEVANT HX SPEC: NORMAL
PHOTO IMAGE: NORMAL

## 2022-01-15 ENCOUNTER — HEALTH MAINTENANCE LETTER (OUTPATIENT)
Age: 42
End: 2022-01-15

## 2022-01-24 ENCOUNTER — LAB REQUISITION (OUTPATIENT)
Dept: LAB | Facility: CLINIC | Age: 42
End: 2022-01-24

## 2022-01-24 LAB — SARS-COV-2 RNA RESP QL NAA+PROBE: NEGATIVE

## 2022-01-24 PROCEDURE — U0003 INFECTIOUS AGENT DETECTION BY NUCLEIC ACID (DNA OR RNA); SEVERE ACUTE RESPIRATORY SYNDROME CORONAVIRUS 2 (SARS-COV-2) (CORONAVIRUS DISEASE [COVID-19]), AMPLIFIED PROBE TECHNIQUE, MAKING USE OF HIGH THROUGHPUT TECHNOLOGIES AS DESCRIBED BY CMS-2020-01-R: HCPCS | Performed by: INTERNAL MEDICINE

## 2022-02-25 ENCOUNTER — LAB REQUISITION (OUTPATIENT)
Dept: LAB | Facility: CLINIC | Age: 42
End: 2022-02-25

## 2022-02-25 LAB — SARS-COV-2 RNA RESP QL NAA+PROBE: NEGATIVE

## 2022-02-25 PROCEDURE — U0005 INFEC AGEN DETEC AMPLI PROBE: HCPCS | Performed by: INTERNAL MEDICINE

## 2022-05-11 PROCEDURE — U0003 INFECTIOUS AGENT DETECTION BY NUCLEIC ACID (DNA OR RNA); SEVERE ACUTE RESPIRATORY SYNDROME CORONAVIRUS 2 (SARS-COV-2) (CORONAVIRUS DISEASE [COVID-19]), AMPLIFIED PROBE TECHNIQUE, MAKING USE OF HIGH THROUGHPUT TECHNOLOGIES AS DESCRIBED BY CMS-2020-01-R: HCPCS | Performed by: INTERNAL MEDICINE

## 2022-05-12 ENCOUNTER — LAB REQUISITION (OUTPATIENT)
Dept: LAB | Facility: CLINIC | Age: 42
End: 2022-05-12

## 2022-05-12 LAB — SARS-COV-2 RNA RESP QL NAA+PROBE: NEGATIVE

## 2022-12-26 ENCOUNTER — HEALTH MAINTENANCE LETTER (OUTPATIENT)
Age: 42
End: 2022-12-26

## 2023-04-22 ENCOUNTER — HEALTH MAINTENANCE LETTER (OUTPATIENT)
Age: 43
End: 2023-04-22

## 2023-08-21 ENCOUNTER — HOSPITAL ENCOUNTER (EMERGENCY)
Facility: CLINIC | Age: 43
Discharge: HOME | End: 2023-08-21
Attending: EMERGENCY MEDICINE | Admitting: EMERGENCY MEDICINE
Payer: COMMERCIAL

## 2023-08-21 ENCOUNTER — APPOINTMENT (OUTPATIENT)
Dept: CT IMAGING | Facility: CLINIC | Age: 43
End: 2023-08-21
Attending: EMERGENCY MEDICINE
Payer: COMMERCIAL

## 2023-08-21 VITALS
DIASTOLIC BLOOD PRESSURE: 86 MMHG | SYSTOLIC BLOOD PRESSURE: 137 MMHG | RESPIRATION RATE: 16 BRPM | OXYGEN SATURATION: 98 % | TEMPERATURE: 97.8 F | HEART RATE: 86 BPM

## 2023-08-21 DIAGNOSIS — G44.011 INTRACTABLE EPISODIC CLUSTER HEADACHE: ICD-10-CM

## 2023-08-21 PROCEDURE — 99284 EMERGENCY DEPT VISIT MOD MDM: CPT | Mod: 25 | Performed by: EMERGENCY MEDICINE

## 2023-08-21 PROCEDURE — 99283 EMERGENCY DEPT VISIT LOW MDM: CPT | Performed by: EMERGENCY MEDICINE

## 2023-08-21 PROCEDURE — 70450 CT HEAD/BRAIN W/O DYE: CPT

## 2023-08-21 ASSESSMENT — ACTIVITIES OF DAILY LIVING (ADL)
ADLS_ACUITY_SCORE: 33
ADLS_ACUITY_SCORE: 35

## 2023-08-21 NOTE — ED PROVIDER NOTES
"    [facility] EMERGENCY DEPARTMENT ([facility])    8/21/23      ED PROVIDER NOTE   ED 5    History     Chief Complaint   Patient presents with    Headache     Onset last night, \"I awoke from a dead sleep with  sudden head pain,\" resolved but had 4 episodes last night, now having a residual headache, right temple.     The history is provided by the patient and medical records.     Nathan Acharya is a 42 year old male who presents with multiple bursts of headache that started last night.  He states that he was in a dead sleep and he woke up with sudden head pain.  He had 4 episodes of these headaches last night.  He has had a persistent residual headache since then, with pain focal in his right temple.    Epic records reviewed, he has a prior history of headaches for which he had an MRI of the brain on 12/15/2017, this was normal.    Past Medical History  Past Medical History:   Diagnosis Date    Testicular cancer (H)      Past Surgical History:   Procedure Laterality Date    COLONOSCOPY N/A 10/14/2021    Procedure: COLONOSCOPY, WITH  BIOPSY;  Surgeon: Sidney Sorto DO;  Location: [facility] OR    ESOPHAGOSCOPY, GASTROSCOPY, DUODENOSCOPY (EGD), COMBINED N/A 10/14/2021    Procedure: ESOPHAGOGASTRODUODENOSCOPY, WITH BIOPSY;  Surgeon: Sidney Sorto DO;  Location: [facility] OR    Testicular Cancer Surgery       chlorhexidine (PERIDEX) 0.12 % solution  fluticasone (FLONASE) 50 MCG/ACT nasal spray  loratadine (CLARITIN) 10 MG tablet  NO ACTIVE MEDICATIONS      No Known Allergies  Family History  Family History   Problem Relation Age of Onset    Prostate Cancer Father      Social History   Social History     Tobacco Use    Smoking status: Former    Smokeless tobacco: Never   Substance Use Topics    Alcohol use: Yes     Comment: occ    Drug use: No         A medically appropriate review of systems was performed with pertinent positives and negatives noted in the HPI, and all other systems negative.    Physical Exam   BP: " 137/86  Pulse: 86  Temp: 97.8  F (36.6  C)  Resp: 16  SpO2: 98 %  Physical Exam  Constitutional:       General: He is not in acute distress.     Appearance: Normal appearance. He is not toxic-appearing.   HENT:      Head: Atraumatic.   Eyes:      General: No scleral icterus.     Conjunctiva/sclera: Conjunctivae normal.   Cardiovascular:      Rate and Rhythm: Normal rate and regular rhythm.      Heart sounds: Normal heart sounds.   Pulmonary:      Effort: Pulmonary effort is normal. No respiratory distress.      Breath sounds: Normal breath sounds.   Abdominal:      General: Abdomen is flat. Bowel sounds are normal.      Palpations: Abdomen is soft.      Tenderness: There is no abdominal tenderness. There is no guarding or rebound.   Musculoskeletal:         General: No deformity.      Cervical back: Neck supple.   Skin:     General: Skin is warm.   Neurological:      General: No focal deficit present.      Mental Status: He is alert and oriented to person, place, and time.      Cranial Nerves: No cranial nerve deficit.      Sensory: No sensory deficit.      Motor: No weakness.      Coordination: Coordination normal.   Psychiatric:         Mood and Affect: Mood normal.         Behavior: Behavior normal.         ED Course, Procedures, & Data      Procedures               Results for orders placed or performed during the hospital encounter of 08/21/23   CT Head w/o Contrast     Status: None    Narrative    CT SCAN OF THE HEAD WITHOUT CONTRAST   8/21/2023 2:50 PM     HISTORY: Sudden onset severe headache, rule out bleed    TECHNIQUE:  Axial images of the head and coronal reformations without  IV contrast material. Radiation dose for this scan was reduced using  automated exposure control, adjustment of the mA and/or kV according  to patient size, or iterative reconstruction technique.    COMPARISON: MRI brain 12/15/2014.    FINDINGS: There is no evidence of intracranial hemorrhage, mass, acute  infarct or anomaly. The  ventricles are normal in size, shape and  configuration. The brain parenchyma and subarachnoid spaces are  normal.     The visualized portions of the sinuses and mastoids appear normal. The  bony calvarium and bones of the skull base appear intact.       Impression    IMPRESSION: No CT findings of acute intracranial process.    CICI LIM MD         SYSTEM ID:  [id number]     Medications - No data to display  Labs Ordered and Resulted from Time of ED Arrival to Time of ED Departure - No data to display  CT Head w/o Contrast   Final Result   IMPRESSION: No CT findings of acute intracranial process.      CICI LIM MD            SYSTEM ID:  [id number]             Critical care was not performed.     Medical Decision Making  The patient's presentation was of low complexity (an acute and uncomplicated illness or injury).    The patient's evaluation involved:  review of 1 test result(s) ordered prior to this encounter (see separate area of note for details)    The patient's management necessitated only low risk treatment.    Assessment & Plan    43 yo male here with uncomplicated headache and a normal neurological exam. A head CT was ordered and was negative for any acute process. With a normal neurological exam and head CT have low concern for any acute neurological process. He did not require pain medication here.    He will be discharged to home with follow up with his PCP in the next week.    I have reviewed the nursing notes. I have reviewed the findings, diagnosis, plan and need for follow up with the patient.    Discharge Medication List as of 8/21/2023  3:16 PM          Final diagnoses:   Intractable episodic cluster headache       Marimar MEJIA, am serving as a trained medical scribe to document services personally performed by Medardo Stearns MD based on the provider's statements to me on August 21, 2023.  This document has been checked and approved by the attending provider.    Medardo MEJIA MD,  was physically present and have reviewed and verified the accuracy of this note documented by Marimar Newton, medical scribe.      Medardo Stearns MD       [facility] EMERGENCY DEPARTMENT  8/21/2023     Medardo Stearns MD  08/25/23 1248

## 2023-11-21 ENCOUNTER — TRANSFERRED RECORDS (OUTPATIENT)
Dept: HEALTH INFORMATION MANAGEMENT | Facility: CLINIC | Age: 43
End: 2023-11-21

## 2024-01-03 ENCOUNTER — MEDICAL CORRESPONDENCE (OUTPATIENT)
Dept: HEALTH INFORMATION MANAGEMENT | Facility: CLINIC | Age: 44
End: 2024-01-03
Payer: COMMERCIAL

## 2024-01-04 ENCOUNTER — TRANSCRIBE ORDERS (OUTPATIENT)
Dept: OTHER | Age: 44
End: 2024-01-04

## 2024-01-04 DIAGNOSIS — G44.039 EPISODIC PAROXYSMAL HEMICRANIA, NOT INTRACTABLE: Primary | ICD-10-CM

## 2024-01-10 ENCOUNTER — E-VISIT (OUTPATIENT)
Dept: URGENT CARE | Facility: CLINIC | Age: 44
End: 2024-01-10
Payer: COMMERCIAL

## 2024-01-10 ENCOUNTER — APPOINTMENT (OUTPATIENT)
Dept: LAB | Facility: CLINIC | Age: 44
End: 2024-01-10
Attending: FAMILY MEDICINE
Payer: COMMERCIAL

## 2024-01-10 ENCOUNTER — VIRTUAL VISIT (OUTPATIENT)
Dept: FAMILY MEDICINE | Facility: CLINIC | Age: 44
End: 2024-01-10
Payer: COMMERCIAL

## 2024-01-10 DIAGNOSIS — J02.9 SORE THROAT: Primary | ICD-10-CM

## 2024-01-10 LAB
DEPRECATED S PYO AG THROAT QL EIA: NEGATIVE
GROUP A STREP BY PCR: NOT DETECTED

## 2024-01-10 PROCEDURE — 99421 OL DIG E/M SVC 5-10 MIN: CPT | Performed by: EMERGENCY MEDICINE

## 2024-01-10 PROCEDURE — 99213 OFFICE O/P EST LOW 20 MIN: CPT | Mod: 95 | Performed by: FAMILY MEDICINE

## 2024-01-10 PROCEDURE — 87651 STREP A DNA AMP PROBE: CPT | Performed by: FAMILY MEDICINE

## 2024-01-10 ASSESSMENT — ENCOUNTER SYMPTOMS: SORE THROAT: 1

## 2024-01-10 NOTE — PROGRESS NOTES
Rodney is a 43 year old who is being evaluated via a billable video visit.      How would you like to obtain your AVS? MyChart  If the video visit is dropped, the invitation should be resent by: Text to cell phone: [phone]  Will anyone else be joining your video visit? No          Assessment & Plan     Sore throat  - Streptococcus A Rapid Screen w/Reflex to PCR - Clinic Collect  - Group A Streptococcus PCR Throat Swab  EHR reviewed.  Past medical history, problem list, past surgical history, family history, social history, medications reviewed, updated, reconciled.   Discussed possible etiology.   Agree with strep rule out. Testing ordered. He will schedule on mychart. Discussed possibility of following culture if needed. Reviewed supportive care. Follow up if no change or worsening.        Indigo Brown MD  [facility]   Rodney is a 43 year old, presenting for the following health issues:  Pharyngitis (3 DAYS)      1/10/2024     8:51 AM   Additional Questions   Roomed by Felecia       Pharyngitis     History of Present Illness       Reason for visit:  SORE THROAT    He eats 4 or more servings of fruits and vegetables daily.He consumes 0 sweetened beverage(s) daily.He exercises with enough effort to increase his heart rate 60 or more minutes per day.  He exercises with enough effort to increase his heart rate 7 days per week.        Acute Illness  Acute illness concerns: sore throat  Onset/Duration: three or four days  Symptoms:  Fever: No  Chills/Sweats: No  Headache (location?): No  Sinus Pressure: No  Conjunctivitis:  No  Ear Pain: no  Rhinorrhea: No  Congestion: No  Sore Throat: No  Cough: no  Wheeze: No  Decreased Appetite: No  Nausea: No  Vomiting: No  Diarrhea: No  Dysuria/Freq.: No  Dysuria or Hematuria: No  Fatigue/Achiness: No  Sick/Strep Exposure: No  Therapies tried and outcome: None    Wants to check for strep throat. No sick contacts but two kids in school.   No fever.  "Feels like glands swollen. Had some pain on the left side of abdomen.   Started an Evisit but rather be seen virtually.   Covid testing was negative.     Review of Systems   HENT:  Positive for sore throat.             Objective    Vitals - Patient Reported  Weight (Patient Reported): 86.2 kg (190 lb)  Height (Patient Reported): 190.5 cm (6' 3\")  BMI (Based on Pt Reported Ht/Wt): 23.75  Pain Score: Moderate Pain (4)        Physical Exam   GENERAL: Healthy, alert and no distress  EYES: Eyes grossly normal to inspection.  No discharge or erythema, or obvious scleral/conjunctival abnormalities.  RESP: No audible wheeze, cough, or visible cyanosis.  No visible retractions or increased work of breathing.    SKIN: Visible skin clear. No significant rash, abnormal pigmentation or lesions.  NEURO: Cranial nerves grossly intact.  Mentation and speech appropriate for age.  PSYCH: Mentation appears normal, affect normal/bright, judgement and insight intact, normal speech and appearance well-groomed.    Results for orders placed or performed in visit on 01/10/24 (from the past 24 hour(s))   Streptococcus A Rapid Screen w/Reflex to PCR - Clinic Collect    Specimen: Throat; Swab   Result Value Ref Range    Group A Strep antigen Negative Negative   Group A Streptococcus PCR Throat Swab    Specimen: Throat; Swab   Result Value Ref Range    Group A strep by PCR Not Detected Not Detected    Narrative    The Xpert Xpress Strep A test, performed on the [employer], is a rapid, qualitative in vitro diagnostic test for the detection of Streptococcus pyogenes (Group A ß-hemolytic Streptococcus, Strep A) in throat swab specimens from patients with signs and symptoms of pharyngitis. The Xpert Xpress Strep A test can be used as an aid in the diagnosis of Group A Streptococcal pharyngitis. The assay is not intended to monitor treatment for Group A Streptococcus infections. The Xpert Xpress Strep A test utilizes an automated " real-time polymerase chain reaction (PCR) to detect Streptococcus pyogenes DNA.           Video-Visit Details    Type of service:  Video Visit     Originating Location (pt. Location): Home    Distant Location (provider location):  On-site  Platform used for Video Visit: [place]

## 2024-01-10 NOTE — PATIENT INSTRUCTIONS
Sore Throat: Care Instructions  Overview     Infection by bacteria or a virus causes most sore throats. Cigarette smoke, dry air, air pollution, allergies, and yelling can also cause a sore throat. Sore throats can be painful and annoying. Fortunately, most sore throats go away on their own. If you have a bacterial infection, your doctor may prescribe antibiotics.  Follow-up care is a key part of your treatment and safety. Be sure to make and go to all appointments, and call your doctor if you are having problems. It's also a good idea to know your test results and keep a list of the medicines you take.  How can you care for yourself at home?    If your doctor prescribed antibiotics, take them as directed. Do not stop taking them just because you feel better. You need to take the full course of antibiotics.    Gargle with warm salt water several times a day to help reduce swelling and relieve pain. Mix 1/2 teaspoon of salt in 1 cup of warm water.    Take an over-the-counter pain medicine, such as acetaminophen (Tylenol), ibuprofen (Advil, Motrin), or naproxen (Aleve). Read and follow all instructions on the label.    Be careful when taking over-the-counter cold or flu medicines and Tylenol at the same time. Many of these medicines have acetaminophen, which is Tylenol. Read the labels to make sure that you are not taking more than the recommended dose. Too much acetaminophen (Tylenol) can be harmful.    Drink plenty of fluids. Fluids may help soothe an irritated throat. Hot fluids, such as tea or soup, may help decrease throat pain.    Use over-the-counter throat lozenges to soothe pain. Regular cough drops or hard candy may also help. These should not be given to young children because of the risk of choking.    Do not smoke or allow others to smoke around you. If you need help quitting, talk to your doctor about stop-smoking programs and medicines. These can increase your chances of quitting for good.    Use a  "vaporizer or humidifier to add moisture to your bedroom. Follow the directions for cleaning the machine.  When should you call for help?   Call your doctor now or seek immediate medical care if:      You have trouble breathing.       Your sore throat gets much worse on one side.       You have new or worse trouble swallowing.       You have a new or higher fever.   Watch closely for changes in your health, and be sure to contact your doctor if you do not get better as expected.  Where can you learn more?  Go to https://www.[employer].net/patiented  Enter U420 in the search box to learn more about \"Sore Throat: Care Instructions.\"  Current as of: February 28, 2023               Content Version: 13.8    [id number] [place].   Care instructions adapted under license by your healthcare professional. If you have questions about a medical condition or this instruction, always ask your healthcare professional. [employer] disclaims any warranty or liability for your use of this information.      "

## 2024-11-06 ENCOUNTER — HOSPITAL ENCOUNTER (EMERGENCY)
Facility: CLINIC | Age: 44
Discharge: HOME | End: 2024-11-06
Attending: EMERGENCY MEDICINE | Admitting: EMERGENCY MEDICINE
Payer: COMMERCIAL

## 2024-11-06 ENCOUNTER — TELEPHONE (OUTPATIENT)
Dept: UROLOGY | Facility: CLINIC | Age: 44
End: 2024-11-06
Payer: COMMERCIAL

## 2024-11-06 ENCOUNTER — APPOINTMENT (OUTPATIENT)
Dept: ULTRASOUND IMAGING | Facility: CLINIC | Age: 44
End: 2024-11-06
Attending: EMERGENCY MEDICINE
Payer: COMMERCIAL

## 2024-11-06 VITALS
DIASTOLIC BLOOD PRESSURE: 91 MMHG | RESPIRATION RATE: 20 BRPM | WEIGHT: 185 LBS | HEART RATE: 66 BPM | HEIGHT: 75 IN | SYSTOLIC BLOOD PRESSURE: 136 MMHG | TEMPERATURE: 98.6 F | BODY MASS INDEX: 23 KG/M2

## 2024-11-06 DIAGNOSIS — M79.662 PAIN OF LEFT LOWER LEG: ICD-10-CM

## 2024-11-06 DIAGNOSIS — N50.89 TESTICULAR CALCIFICATION: ICD-10-CM

## 2024-11-06 DIAGNOSIS — N50.812 TESTICULAR PAIN, LEFT: ICD-10-CM

## 2024-11-06 LAB
ALBUMIN UR-MCNC: NEGATIVE MG/DL
APPEARANCE UR: CLEAR
BILIRUB UR QL STRIP: NEGATIVE
COLOR UR AUTO: NORMAL
GLUCOSE UR STRIP-MCNC: NEGATIVE MG/DL
HGB UR QL STRIP: NEGATIVE
KETONES UR STRIP-MCNC: NEGATIVE MG/DL
LEUKOCYTE ESTERASE UR QL STRIP: NEGATIVE
NITRATE UR QL: NEGATIVE
PH UR STRIP: 6.5 [PH] (ref 5–7)
RBC URINE: <1 /HPF
SP GR UR STRIP: 1.02 (ref 1–1.03)
UROBILINOGEN UR STRIP-MCNC: NORMAL MG/DL
WBC URINE: 0 /HPF

## 2024-11-06 PROCEDURE — 81003 URINALYSIS AUTO W/O SCOPE: CPT | Performed by: EMERGENCY MEDICINE

## 2024-11-06 PROCEDURE — 99284 EMERGENCY DEPT VISIT MOD MDM: CPT | Mod: 25

## 2024-11-06 PROCEDURE — 93976 VASCULAR STUDY: CPT

## 2024-11-06 PROCEDURE — 93971 EXTREMITY STUDY: CPT | Mod: LT

## 2024-11-06 ASSESSMENT — COLUMBIA-SUICIDE SEVERITY RATING SCALE - C-SSRS
6. HAVE YOU EVER DONE ANYTHING, STARTED TO DO ANYTHING, OR PREPARED TO DO ANYTHING TO END YOUR LIFE?: NO
2. HAVE YOU ACTUALLY HAD ANY THOUGHTS OF KILLING YOURSELF IN THE PAST MONTH?: NO
1. IN THE PAST MONTH, HAVE YOU WISHED YOU WERE DEAD OR WISHED YOU COULD GO TO SLEEP AND NOT WAKE UP?: NO

## 2024-11-06 ASSESSMENT — ACTIVITIES OF DAILY LIVING (ADL)
ADLS_ACUITY_SCORE: 0
ADLS_ACUITY_SCORE: 0

## 2024-11-06 NOTE — TELEPHONE ENCOUNTER
[facility] Call Center    Phone Message    May a detailed message be left on voicemail: no     Reason for Call: Other: Patient called to see if the clinic could get him in for a sooner appointment then December 2nd. Patient stated that he is in a lot of pain and would like to talk with the clinic.     Action Taken: Message routed to:  [facility] ([facility] Urology    Travel Screening: Not Applicable

## 2024-11-07 NOTE — ED TRIAGE NOTES
Patient here  with testicular pain which started 2 days ago     Triage Assessment (Adult)       Row Name 11/06/24 1920          Triage Assessment    Airway WDL WDL        Respiratory WDL    Respiratory WDL WDL        Skin Circulation/Temperature WDL    Skin Circulation/Temperature WDL WDL        Cardiac WDL    Cardiac WDL WDL        Peripheral/Neurovascular WDL    Peripheral Neurovascular WDL WDL        Cognitive/Neuro/Behavioral WDL    Cognitive/Neuro/Behavioral WDL WDL

## 2024-11-07 NOTE — ED PROVIDER NOTES
"  Emergency Department Note      History of Present Illness     Chief Complaint   Groin Swelling    HPI   Nathan Acharya is a 44 year old male with a history of testicular cancer status post right orchiectomy who presents to the ER for left testicular pain that radiates down his left leg to his foot intermittently and up to the left inguinal region. Patient reports that he has a cyst in his left testicle for which she follows with urology, that he has had to take antibiotics for testicular infection in the remote past. He states that his urologist they retired so he has an appointment with a new urologist. He endorses nausea and swelling. Rodney denies fever, chills, dysuria, hematuria, testicular swelling, loss of appetite, diarrhea, blood in the stool, back pain, swelling in his left leg.  He is concerned about a possible DVT of his leg.  No recent injury or trauma.    Independent Historian   None    Review of External Notes   none    Past Medical History     Medical History and Problem List   Testicular cancer     Medications   Alprazolam     Surgical History   Colonoscopy   EGD  Right Orchiectomy    Physical Exam     Patient Vitals for the past 24 hrs:   BP Temp Temp src Pulse Resp Height Weight   11/06/24 1918 (!) 136/91 98.6  F (37  C) Oral 66 20 1.905 m (6' 3\") 83.9 kg (185 lb)     Physical Exam  Nursing note and vitals reviewed.  Constitutional:  Appears well-developed and well-nourished.   HENT:   Head:    Atraumatic.   Mouth/Throat:   Oropharynx is clear and moist. No oropharyngeal exudate.   Eyes:    Pupils are equal, round, and reactive to light.   Neck:    Normal range of motion. Neck supple.      No tracheal deviation present. No thyromegaly present.   Cardiovascular:  Normal rate, regular rhythm, no murmur   Pulmonary/Chest: Breath sounds are clear and equal without wheezes or crackles.  Abdominal:   Soft. Bowel sounds are normal. Exhibits no distension and      no mass. There is no tenderness. "      There is no rebound and no guarding.  :                             Right testicle is absent.  Left testicle is non tender without palpable swelling or mass. No palpable inguinal or scrotal hernia.  No inguinal lymphadenopathy.  Musculoskeletal:  Exhibits no edema.   Lymphadenopathy:  No cervical adenopathy.   Neurological:   Alert and oriented to person, place, and time.   Skin:    Skin is warm and dry. No rash noted. No pallor.       Diagnostics     Lab Results   Labs Ordered and Resulted from Time of ED Arrival to Time of ED Departure   ROUTINE UA WITH MICROSCOPIC REFLEX TO CULTURE - Normal       Result Value    Color Urine Light Yellow      Appearance Urine Clear      Glucose Urine Negative      Bilirubin Urine Negative      Ketones Urine Negative      Specific Gravity Urine 1.022      Blood Urine Negative      pH Urine 6.5      Protein Albumin Urine Negative      Urobilinogen Urine Normal      Nitrite Urine Negative      Leukocyte Esterase Urine Negative      RBC Urine <1      WBC Urine 0         Imaging   US Lower Extremity Venous Duplex Left   Final Result   IMPRESSION:   1.  No deep venous thrombosis in the left lower extremity.      US Testicular & Scrotum w Doppler Ltd   Final Result   IMPRESSION:   1.  The right testicle is surgically absent.      2.  Slight microlithiasis of the left testicle, please refer to below reference for possible follow-up.      3.  Tiny benign epididymal cyst.      4.  Slightly prominent vascularity of the left testicle which appears stable since 12/18/2020, thus this is of unlikely clinical significance.      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 In the absence of any other risk factors for testicular cancer, no further imaging or biochemical follow-up is necessary; all that is recommended is routine    monthly testicular self examination. However, if the patient has risk factors for testicular cancer, referral to  a urologist for evaluation and determination of an optimal follow-up strategy is recommended.      REFERENCE:   Testicular Microlithiasis: What Should You Recommend? AJR 2016; 206:[id number].                    Independent Interpretation   None    ED Course      Medications Administered   Medications - No data to display    Discussion of Management   None    ED Course   ED Course as of 11/06/24 2314   Wed Nov 06, 2024 1927 I obtained the history and examined the patient as noted above.        Additional Documentation  None    Medical Decision Making / Diagnosis     CMS Diagnoses: None    MIPS       None    MDM   Nathan Acharya is a 44 year old male with a remote history of right testicular cancer S/P right Orchiectomy who arrives due to left testicular pain which radiates down his left leg to his foot and up to the left inguinal region.  I was initially concerned about the possibility of left testicle torsion, epididymitis or orchitis or inguinal hernia; however ultimately the cause of his symptoms is unclear.  Testicular ultrasound did not show any sign of epididymitis, orchitis or torsion.  I discussed the incidental findings of testicular microlithiasis with him and he was instructed to follow-up with his urologist within the next few days.  Per the radiologist the ultrasound findings are stable and the same as previous ultrasound from 2020, however I discussed with the patient that considering his history of testicular cancer he requires urgent follow-up.  Ultrasound does not show any sign of orchitis or epididymitis per the radiologist and he does not have any tenderness or swelling of the testicle or epididymitis so I did not feel that antibiotics were indicated at this time.  Shared decision making was made with the patient and antibiotics were offered however he declines antibody at this time he will follow-up closely with urology.  He was told to return if he develops any worsening testicular pain or  any testicular swelling, fevers or chills.  There was no sign of DVT of his leg on ultrasound.  I felt that his symptoms could be due to a groin strain or radicular pain there are no red flag indication for imaging.  He does not have any sign of hernia.  I did not feel there was any concerning intra-abdominal process such as diverticulitis.  No sign of cellulitis or inguinal lymphadenopathy.  No sign of UTI.  I felt he could be safely discharged home with close follow-up and return precautions.    Disposition   The patient was discharged.     Diagnosis     ICD-10-CM    1. Testicular pain, left  N50.812 Adult Urology  Referral      2. Testicular calcification  N50.89 Adult Urology  Referral    left      3. Pain of left lower leg  M79.662            Discharge Medications   Discharge Medication List as of 11/6/2024  9:49 PM            Scribe Disclosure:  I, Yair Aguilar, am serving as a scribe at 7:35 PM on 11/6/2024 to document services personally performed by Enriqueta Haywood MD based on my observations and the provider's statements to me.        Enriqueta Haywood MD  11/07/24 0256

## 2024-11-07 NOTE — TELEPHONE ENCOUNTER
This encounter is being sent to inform the clinic that this patient has a referral from Enriqueta Haywood MD for the diagnoses of Testicular pain, left; Testicular calcification; left testicular pain, Microcalcifications seen left testicle-- history of testicular cancer on right S/P Orchiectomy and has requested that this patient be seen within Emergency: 1-2 Days and/or with Emergency: 1-2 Days.  Based on the availability of our provider(s), we are unable to accommodate this request.    Were all sites offered this patient?  Yes    Does scheduling algorithm request to schedule next available?  Patient has been scheduled for the first available opening with Kathya Case APRN CNP in [place] on 12/02/24.  We have informed the patient that the clinic will review their referral and reach out if a sooner appointment is medically necessary.      Please review new referral order and call Pt if you can move up his Appt - Thank you!

## 2024-11-08 NOTE — CONFIDENTIAL NOTE
Unfortunately as of 11/8/2024, there are no sooner openings with the provider he is scheduled with.  Per chart review.  Patient did have a testicular ultrasound on 11/6/2024.    Will add name to cancelation list.    Makenna COWART RN   [facility], Urology   11/8/2024 2:19 PM

## 2025-01-04 ENCOUNTER — HEALTH MAINTENANCE LETTER (OUTPATIENT)
Age: 45
End: 2025-01-04

## (undated) DEVICE — KIT ENDO TURNOVER/PROCEDURE CARRY-ON 101822

## (undated) DEVICE — SUCTION MANIFOLD NEPTUNE 2 SYS 1 PORT 702-025-000

## (undated) DEVICE — ENDO FORCEP BX CAPTURA PRO SPIKE G50696

## (undated) DEVICE — SUCTION CATH AIRLIFE TRI-FLO W/CONTROL PORT 14FR  T60C

## (undated) DEVICE — SYR 30ML SLIP TIP W/O NDL 302833

## (undated) DEVICE — GOWN IMPERVIOUS 2XL BLUE

## (undated) DEVICE — SPECIMEN CONTAINER 3OZ W/FORMALIN 59901

## (undated) DEVICE — ENDO BITE BLOCK ADULT OMNI-BLOC

## (undated) DEVICE — TUBING SUCTION 12"X1/4" N612

## (undated) DEVICE — SOL WATER IRRIG 500ML BOTTLE 2F7113

## (undated) DEVICE — GLOVE EXAM NITRILE LG PF LATEX FREE 5064

## (undated) RX ORDER — NITROGLYCERIN 0.4 MG/1
TABLET SUBLINGUAL
Status: DISPENSED
Start: 2018-04-09

## (undated) RX ORDER — SODIUM CHLORIDE 9 MG/ML
INJECTION, SOLUTION INTRAVENOUS
Status: DISPENSED
Start: 2018-04-09